# Patient Record
Sex: MALE | Race: BLACK OR AFRICAN AMERICAN | NOT HISPANIC OR LATINO | URBAN - METROPOLITAN AREA
[De-identification: names, ages, dates, MRNs, and addresses within clinical notes are randomized per-mention and may not be internally consistent; named-entity substitution may affect disease eponyms.]

---

## 2017-01-12 ENCOUNTER — EMERGENCY (EMERGENCY)
Facility: HOSPITAL | Age: 37
LOS: 0 days | Discharge: HOME | End: 2017-01-12
Admitting: EMERGENCY MEDICINE
Payer: COMMERCIAL
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
## 2017-01-12 DIAGNOSIS — M79.671 PAIN IN RIGHT FOOT: ICD-10-CM

## 2017-01-12 DIAGNOSIS — S90.31XA CONTUSION OF RIGHT FOOT, INITIAL ENCOUNTER: ICD-10-CM

## 2017-01-12 PROCEDURE — 99283 EMERGENCY DEPT VISIT LOW MDM: CPT

## 2017-01-12 PROCEDURE — 73630 X-RAY EXAM OF FOOT: CPT | Mod: 26,RT

## 2017-03-12 ENCOUNTER — EMERGENCY (EMERGENCY)
Facility: HOSPITAL | Age: 37
LOS: 0 days | Discharge: HOME | End: 2017-03-12
Attending: EMERGENCY MEDICINE | Admitting: EMERGENCY MEDICINE
Payer: COMMERCIAL

## 2017-03-12 VITALS
WEIGHT: 167.99 LBS | HEIGHT: 71 IN | SYSTOLIC BLOOD PRESSURE: 138 MMHG | OXYGEN SATURATION: 100 % | RESPIRATION RATE: 19 BRPM | TEMPERATURE: 99 F | DIASTOLIC BLOOD PRESSURE: 68 MMHG | HEART RATE: 91 BPM

## 2017-03-12 VITALS
RESPIRATION RATE: 16 BRPM | SYSTOLIC BLOOD PRESSURE: 130 MMHG | DIASTOLIC BLOOD PRESSURE: 70 MMHG | TEMPERATURE: 99 F | HEART RATE: 89 BPM | OXYGEN SATURATION: 100 %

## 2017-03-12 DIAGNOSIS — Z11.59 ENCOUNTER FOR SCREENING FOR OTHER VIRAL DISEASES: ICD-10-CM

## 2017-03-12 DIAGNOSIS — R10.2 PELVIC AND PERINEAL PAIN: ICD-10-CM

## 2017-03-12 LAB
APPEARANCE UR: (no result)
BACTERIA # UR AUTO: (no result)
BILIRUB UR-MCNC: (no result)
COLOR SPEC: (no result)
COMMENT - URINE: SIGNIFICANT CHANGE UP
DIFF PNL FLD: (no result)
EPI CELLS # UR: SIGNIFICANT CHANGE UP
GLUCOSE UR QL: NEGATIVE MG/DL — SIGNIFICANT CHANGE UP
KETONES UR-MCNC: NEGATIVE — SIGNIFICANT CHANGE UP
LEUKOCYTE ESTERASE UR-ACNC: (no result)
NITRITE UR-MCNC: NEGATIVE — SIGNIFICANT CHANGE UP
PH UR: 5 — SIGNIFICANT CHANGE UP (ref 4.8–8)
PROT UR-MCNC: 30 MG/DL
RBC CASTS # UR COMP ASSIST: (no result) /HPF (ref 0–4)
SP GR SPEC: 1.02 — SIGNIFICANT CHANGE UP (ref 1.01–1.02)
UROBILINOGEN FLD QL: 4 MG/DL
WBC UR QL: SIGNIFICANT CHANGE UP

## 2017-03-12 PROCEDURE — 99283 EMERGENCY DEPT VISIT LOW MDM: CPT

## 2017-03-12 RX ORDER — IBUPROFEN 200 MG
600 TABLET ORAL ONCE
Qty: 0 | Refills: 0 | Status: COMPLETED | OUTPATIENT
Start: 2017-03-12 | End: 2017-03-12

## 2017-03-12 RX ADMIN — Medication 600 MILLIGRAM(S): at 12:13

## 2017-03-12 RX ADMIN — Medication 600 MILLIGRAM(S): at 11:29

## 2017-03-12 NOTE — ED STATDOCS - GENITOURINARY, MLM
normal... mild swelling above base of penis. mild induration. no fluctuance or erythema,. no hernia. no penile lesions, discharge. no testicular tenderness. no scrotal swelling.

## 2017-03-12 NOTE — ED STATDOCS - ATTENDING CONTRIBUTION TO CARE
I, Darwin Rocha, performed the initial face to face bedside interview with this patient regarding history of present illness, review of symptoms and relevant past medical, social and family history.  I completed an independent physical examination.  I was the initial provider who evaluated this patient. I have signed out the follow up of any pending tests (i.e. labs, radiological studies) to the ACP.  I have communicated the patient’s plan of care and disposition with the ACP.  The history, relevant review of systems, past medical and surgical history, medical decision making, and physical examination was documented by the scribe in my presence and I attest to the accuracy of the documentation.

## 2017-03-12 NOTE — ED STATDOCS - PROGRESS NOTE DETAILS
PA NOTE: Pt seen by intake physician and orders/plan evaluated. PT presenting to ED with complaints of...37 y/o M with no Pmhx presents to ED c/o testicular and penile swelling and pain that began yesterday. States symptoms began after sexual intercourse. Sx have improved since last night. States Denies fever, difficulty urinating, hematuria.  PE: GEN: Awake, alert, interactive, NAD, non-toxic appearing. EYES: PERRL    :  exam by attending.    PLAN:  SIMONA So PA-C Pt. advised FU with urology.  Copies of labs provided to patient.  Kiara So PA-C

## 2017-03-12 NOTE — ED STATDOCS - OBJECTIVE STATEMENT
37 y/o M with no Pmhx presents to ED c/o testicular and penile swelling and pain that began yesterday. States symptoms began after sexual intercourse. Sx have improved since last night. States Denies fever, difficulty urinating, hematuria.

## 2017-03-14 LAB
C TRACH RRNA SPEC QL NAA+PROBE: SIGNIFICANT CHANGE UP
N GONORRHOEA RRNA SPEC QL NAA+PROBE: SIGNIFICANT CHANGE UP
SPECIMEN SOURCE: SIGNIFICANT CHANGE UP

## 2017-03-15 ENCOUNTER — APPOINTMENT (OUTPATIENT)
Dept: UROLOGY | Facility: CLINIC | Age: 37
End: 2017-03-15

## 2017-03-15 VITALS
HEIGHT: 71.5 IN | TEMPERATURE: 97.7 F | HEART RATE: 83 BPM | BODY MASS INDEX: 23 KG/M2 | SYSTOLIC BLOOD PRESSURE: 149 MMHG | DIASTOLIC BLOOD PRESSURE: 79 MMHG | RESPIRATION RATE: 16 BRPM | WEIGHT: 168 LBS

## 2017-03-15 DIAGNOSIS — S30.1XXA CONTUSION OF ABDOMINAL WALL, INITIAL ENCOUNTER: ICD-10-CM

## 2017-03-15 DIAGNOSIS — R31.29 OTHER MICROSCOPIC HEMATURIA: ICD-10-CM

## 2017-03-16 LAB
APPEARANCE: CLEAR
BACTERIA: NEGATIVE
BILIRUBIN URINE: NEGATIVE
BLOOD URINE: NEGATIVE
COLOR: YELLOW
GLUCOSE QUALITATIVE U: NORMAL MG/DL
HYALINE CASTS: 2 /LPF
KETONES URINE: ABNORMAL
LEUKOCYTE ESTERASE URINE: NEGATIVE
MICROSCOPIC-UA: NORMAL
NITRITE URINE: NEGATIVE
PH URINE: 5.5
PROTEIN URINE: NEGATIVE MG/DL
RED BLOOD CELLS URINE: 3 /HPF
SPECIFIC GRAVITY URINE: 1.04
SQUAMOUS EPITHELIAL CELLS: 1 /HPF
UROBILINOGEN URINE: NORMAL MG/DL
WHITE BLOOD CELLS URINE: 2 /HPF

## 2017-03-17 ENCOUNTER — OUTPATIENT (OUTPATIENT)
Dept: OUTPATIENT SERVICES | Facility: HOSPITAL | Age: 37
LOS: 1 days | End: 2017-03-17
Payer: COMMERCIAL

## 2017-03-17 ENCOUNTER — APPOINTMENT (OUTPATIENT)
Dept: CT IMAGING | Facility: CLINIC | Age: 37
End: 2017-03-17

## 2017-03-17 DIAGNOSIS — Z00.8 ENCOUNTER FOR OTHER GENERAL EXAMINATION: ICD-10-CM

## 2017-03-17 LAB — BACTERIA UR CULT: NORMAL

## 2017-03-17 PROCEDURE — 72193 CT PELVIS W/DYE: CPT

## 2017-03-18 ENCOUNTER — EMERGENCY (EMERGENCY)
Facility: HOSPITAL | Age: 37
LOS: 0 days | Discharge: HOME | End: 2017-03-19
Attending: EMERGENCY MEDICINE | Admitting: EMERGENCY MEDICINE
Payer: COMMERCIAL

## 2017-03-18 VITALS
HEART RATE: 84 BPM | DIASTOLIC BLOOD PRESSURE: 81 MMHG | RESPIRATION RATE: 19 BRPM | TEMPERATURE: 98 F | HEIGHT: 71.5 IN | SYSTOLIC BLOOD PRESSURE: 122 MMHG | OXYGEN SATURATION: 100 % | WEIGHT: 167.99 LBS

## 2017-03-18 DIAGNOSIS — L02.211 CUTANEOUS ABSCESS OF ABDOMINAL WALL: ICD-10-CM

## 2017-03-18 PROCEDURE — 99284 EMERGENCY DEPT VISIT MOD MDM: CPT | Mod: 25

## 2017-03-18 PROCEDURE — 10061 I&D ABSCESS COMP/MULTIPLE: CPT

## 2017-03-18 NOTE — ED ADULT NURSE NOTE - CHPI ED SYMPTOMS NEG
no dysuria/no vomiting/no blood in stool/no burning urination/no nausea/no abdominal distension/no hematuria/no chills/no fever/no diarrhea

## 2017-03-19 ENCOUNTER — EMERGENCY (EMERGENCY)
Facility: HOSPITAL | Age: 37
LOS: 0 days | Discharge: HOME | End: 2017-03-20
Attending: EMERGENCY MEDICINE | Admitting: EMERGENCY MEDICINE
Payer: COMMERCIAL

## 2017-03-19 VITALS
DIASTOLIC BLOOD PRESSURE: 71 MMHG | OXYGEN SATURATION: 100 % | HEART RATE: 81 BPM | SYSTOLIC BLOOD PRESSURE: 116 MMHG | RESPIRATION RATE: 18 BRPM

## 2017-03-19 VITALS
RESPIRATION RATE: 16 BRPM | WEIGHT: 179.9 LBS | DIASTOLIC BLOOD PRESSURE: 72 MMHG | HEART RATE: 78 BPM | TEMPERATURE: 98 F | SYSTOLIC BLOOD PRESSURE: 149 MMHG | OXYGEN SATURATION: 99 %

## 2017-03-19 DIAGNOSIS — Z48.00 ENCOUNTER FOR CHANGE OR REMOVAL OF NONSURGICAL WOUND DRESSING: ICD-10-CM

## 2017-03-19 LAB
ALBUMIN SERPL ELPH-MCNC: 3.4 G/DL — SIGNIFICANT CHANGE UP (ref 3.3–5)
ALP SERPL-CCNC: 69 U/L — SIGNIFICANT CHANGE UP (ref 40–120)
ALT FLD-CCNC: 22 U/L — SIGNIFICANT CHANGE UP (ref 12–78)
ANION GAP SERPL CALC-SCNC: 10 MMOL/L — SIGNIFICANT CHANGE UP (ref 5–17)
AST SERPL-CCNC: 17 U/L — SIGNIFICANT CHANGE UP (ref 15–37)
BASOPHILS # BLD AUTO: 0.1 K/UL — SIGNIFICANT CHANGE UP (ref 0–0.2)
BASOPHILS NFR BLD AUTO: 1.1 % — SIGNIFICANT CHANGE UP (ref 0–2)
BILIRUB SERPL-MCNC: 0.2 MG/DL — SIGNIFICANT CHANGE UP (ref 0.2–1.2)
BUN SERPL-MCNC: 21 MG/DL — SIGNIFICANT CHANGE UP (ref 7–23)
CALCIUM SERPL-MCNC: 8.4 MG/DL — LOW (ref 8.5–10.1)
CHLORIDE SERPL-SCNC: 102 MMOL/L — SIGNIFICANT CHANGE UP (ref 96–108)
CO2 SERPL-SCNC: 29 MMOL/L — SIGNIFICANT CHANGE UP (ref 22–31)
CREAT SERPL-MCNC: 1.29 MG/DL — SIGNIFICANT CHANGE UP (ref 0.5–1.3)
EOSINOPHIL # BLD AUTO: 0.3 K/UL — SIGNIFICANT CHANGE UP (ref 0–0.5)
EOSINOPHIL NFR BLD AUTO: 3.4 % — SIGNIFICANT CHANGE UP (ref 0–6)
GLUCOSE SERPL-MCNC: 93 MG/DL — SIGNIFICANT CHANGE UP (ref 70–99)
HCT VFR BLD CALC: 44.6 % — SIGNIFICANT CHANGE UP (ref 39–50)
HGB BLD-MCNC: 13.7 G/DL — SIGNIFICANT CHANGE UP (ref 13–17)
LACTATE SERPL-SCNC: 1.1 MMOL/L — SIGNIFICANT CHANGE UP (ref 0.7–2)
LYMPHOCYTES # BLD AUTO: 2.6 K/UL — SIGNIFICANT CHANGE UP (ref 1–3.3)
LYMPHOCYTES # BLD AUTO: 27.6 % — SIGNIFICANT CHANGE UP (ref 13–44)
MCHC RBC-ENTMCNC: 27.6 PG — SIGNIFICANT CHANGE UP (ref 27–34)
MCHC RBC-ENTMCNC: 30.6 GM/DL — LOW (ref 32–36)
MCV RBC AUTO: 90.2 FL — SIGNIFICANT CHANGE UP (ref 80–100)
MONOCYTES # BLD AUTO: 1 K/UL — HIGH (ref 0–0.9)
MONOCYTES NFR BLD AUTO: 10.2 % — SIGNIFICANT CHANGE UP (ref 2–14)
NEUTROPHILS # BLD AUTO: 5.4 K/UL — SIGNIFICANT CHANGE UP (ref 1.8–7.4)
NEUTROPHILS NFR BLD AUTO: 57.6 % — SIGNIFICANT CHANGE UP (ref 43–77)
PLATELET # BLD AUTO: 354 K/UL — SIGNIFICANT CHANGE UP (ref 150–400)
POTASSIUM SERPL-MCNC: 3.7 MMOL/L — SIGNIFICANT CHANGE UP (ref 3.5–5.3)
POTASSIUM SERPL-SCNC: 3.7 MMOL/L — SIGNIFICANT CHANGE UP (ref 3.5–5.3)
PROT SERPL-MCNC: 7.9 GM/DL — SIGNIFICANT CHANGE UP (ref 6–8.3)
RBC # BLD: 4.95 M/UL — SIGNIFICANT CHANGE UP (ref 4.2–5.8)
RBC # FLD: 11.7 % — SIGNIFICANT CHANGE UP (ref 10.3–14.5)
SODIUM SERPL-SCNC: 141 MMOL/L — SIGNIFICANT CHANGE UP (ref 135–145)
WBC # BLD: 9.4 K/UL — SIGNIFICANT CHANGE UP (ref 3.8–10.5)
WBC # FLD AUTO: 9.4 K/UL — SIGNIFICANT CHANGE UP (ref 3.8–10.5)

## 2017-03-19 PROCEDURE — 99281 EMR DPT VST MAYX REQ PHY/QHP: CPT

## 2017-03-19 RX ORDER — OXYCODONE HYDROCHLORIDE 5 MG/1
1 TABLET ORAL
Qty: 12 | Refills: 0 | OUTPATIENT
Start: 2017-03-19

## 2017-03-19 RX ORDER — MORPHINE SULFATE 50 MG/1
8 CAPSULE, EXTENDED RELEASE ORAL ONCE
Qty: 0 | Refills: 0 | Status: DISCONTINUED | OUTPATIENT
Start: 2017-03-19 | End: 2017-03-19

## 2017-03-19 RX ORDER — MORPHINE SULFATE 50 MG/1
4 CAPSULE, EXTENDED RELEASE ORAL ONCE
Qty: 0 | Refills: 0 | Status: DISCONTINUED | OUTPATIENT
Start: 2017-03-19 | End: 2017-03-19

## 2017-03-19 RX ORDER — AZTREONAM 2 G
2 VIAL (EA) INJECTION
Qty: 28 | Refills: 0 | OUTPATIENT
Start: 2017-03-19 | End: 2017-03-26

## 2017-03-19 RX ORDER — VANCOMYCIN HCL 1 G
1000 VIAL (EA) INTRAVENOUS ONCE
Qty: 0 | Refills: 0 | Status: COMPLETED | OUTPATIENT
Start: 2017-03-19 | End: 2017-03-19

## 2017-03-19 RX ADMIN — Medication 250 MILLIGRAM(S): at 02:40

## 2017-03-19 RX ADMIN — MORPHINE SULFATE 8 MILLIGRAM(S): 50 CAPSULE, EXTENDED RELEASE ORAL at 03:19

## 2017-03-19 RX ADMIN — MORPHINE SULFATE 8 MILLIGRAM(S): 50 CAPSULE, EXTENDED RELEASE ORAL at 03:50

## 2017-03-19 RX ADMIN — MORPHINE SULFATE 4 MILLIGRAM(S): 50 CAPSULE, EXTENDED RELEASE ORAL at 02:00

## 2017-03-19 RX ADMIN — MORPHINE SULFATE 4 MILLIGRAM(S): 50 CAPSULE, EXTENDED RELEASE ORAL at 02:30

## 2017-03-19 NOTE — ED PROVIDER NOTE - MEDICAL DECISION MAKING DETAILS
35 yo male with hematoma to lower abdominal/pelvic wall, now with abscess formed.  No Edwin's.  Afebrile, labs wnl.  s/p I/D.

## 2017-03-19 NOTE — ED PROVIDER NOTE - PROGRESS NOTE DETAILS
Found the report of CT scan that was done on Friday. Results showed fluid collection ventral abd wall with no intra abd communication. 4.2 x 2.5 x 3.3 cm
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
## 2017-03-19 NOTE — ED PROVIDER NOTE - SKIN, MLM
Skin normal color for race, warm, dry and intact. No evidence of rash. Skin normal color for race, warm, dry and intact. Large 5 x 3 cm abscess just proximal to shaft of penis on abdominal wall, fluctuant, actively draining.  No surrounding erythema

## 2017-03-19 NOTE — ED PROVIDER NOTE - DETAILS:
Saumya Jarquin MD - The scribe's documentation has been prepared under my direction and personally reviewed by me in its entirety. I confirm that the note above accurately reflects all work, treatment, procedures, and medical decision making performed by me.

## 2017-03-19 NOTE — ED PROVIDER NOTE - NS ED MD SCRIBE ATTENDING SCRIBE SECTIONS
REVIEW OF SYSTEMS/PHYSICAL EXAM/HISTORY OF PRESENT ILLNESS/PAST MEDICAL/SURGICAL/SOCIAL HISTORY/PROGRESS NOTE

## 2017-03-20 NOTE — ED PROVIDER NOTE - SKIN WOUND TYPE
abscess anterior abdominal wall just above shaft of penis, markedly improved from yesterday, actively draining, no erythema/abscess(s)

## 2017-03-20 NOTE — ED PROVIDER NOTE - PROGRESS NOTE DETAILS
Moderate amount of purulent fluid expressed from already open and draining abscess.  Offered patient further exploration but pt refuses at this time.  Will continue warm compresses and showers and return to ED if symptoms worsen, f/u urology as scheduled Friday, but try to move the appointment sooner.  IF the appointment can't be moved up pt instructed to return to ED for wound check with me on Wednesday.
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
## 2017-03-24 ENCOUNTER — APPOINTMENT (OUTPATIENT)
Dept: UROLOGY | Facility: CLINIC | Age: 37
End: 2017-03-24

## 2017-03-24 LAB
CULTURE RESULTS: SIGNIFICANT CHANGE UP
CULTURE RESULTS: SIGNIFICANT CHANGE UP
SPECIMEN SOURCE: SIGNIFICANT CHANGE UP
SPECIMEN SOURCE: SIGNIFICANT CHANGE UP

## 2017-09-05 ENCOUNTER — EMERGENCY (EMERGENCY)
Facility: HOSPITAL | Age: 37
LOS: 0 days | Discharge: HOME | End: 2017-09-05
Attending: EMERGENCY MEDICINE | Admitting: EMERGENCY MEDICINE
Payer: COMMERCIAL

## 2017-09-05 VITALS
WEIGHT: 160.06 LBS | TEMPERATURE: 98 F | HEIGHT: 71 IN | DIASTOLIC BLOOD PRESSURE: 85 MMHG | HEART RATE: 60 BPM | SYSTOLIC BLOOD PRESSURE: 112 MMHG | RESPIRATION RATE: 16 BRPM

## 2017-09-05 VITALS
RESPIRATION RATE: 18 BRPM | DIASTOLIC BLOOD PRESSURE: 58 MMHG | OXYGEN SATURATION: 100 % | TEMPERATURE: 98 F | SYSTOLIC BLOOD PRESSURE: 124 MMHG | HEART RATE: 75 BPM

## 2017-09-05 DIAGNOSIS — R07.9 CHEST PAIN, UNSPECIFIED: ICD-10-CM

## 2017-09-05 DIAGNOSIS — J40 BRONCHITIS, NOT SPECIFIED AS ACUTE OR CHRONIC: ICD-10-CM

## 2017-09-05 LAB
ANION GAP SERPL CALC-SCNC: 5 MMOL/L — SIGNIFICANT CHANGE UP (ref 5–17)
BUN SERPL-MCNC: 11 MG/DL — SIGNIFICANT CHANGE UP (ref 7–23)
CALCIUM SERPL-MCNC: 9.3 MG/DL — SIGNIFICANT CHANGE UP (ref 8.5–10.1)
CHLORIDE SERPL-SCNC: 102 MMOL/L — SIGNIFICANT CHANGE UP (ref 96–108)
CO2 SERPL-SCNC: 31 MMOL/L — SIGNIFICANT CHANGE UP (ref 22–31)
CREAT SERPL-MCNC: 1.1 MG/DL — SIGNIFICANT CHANGE UP (ref 0.5–1.3)
GLUCOSE SERPL-MCNC: 95 MG/DL — SIGNIFICANT CHANGE UP (ref 70–99)
HCT VFR BLD CALC: 42 % — SIGNIFICANT CHANGE UP (ref 39–50)
HGB BLD-MCNC: 14 G/DL — SIGNIFICANT CHANGE UP (ref 13–17)
MCHC RBC-ENTMCNC: 29.6 PG — SIGNIFICANT CHANGE UP (ref 27–34)
MCHC RBC-ENTMCNC: 33.3 GM/DL — SIGNIFICANT CHANGE UP (ref 32–36)
MCV RBC AUTO: 89 FL — SIGNIFICANT CHANGE UP (ref 80–100)
PLATELET # BLD AUTO: 249 K/UL — SIGNIFICANT CHANGE UP (ref 150–400)
POTASSIUM SERPL-MCNC: 4 MMOL/L — SIGNIFICANT CHANGE UP (ref 3.5–5.3)
POTASSIUM SERPL-SCNC: 4 MMOL/L — SIGNIFICANT CHANGE UP (ref 3.5–5.3)
RBC # BLD: 4.72 M/UL — SIGNIFICANT CHANGE UP (ref 4.2–5.8)
RBC # FLD: 11.8 % — SIGNIFICANT CHANGE UP (ref 10.3–14.5)
SODIUM SERPL-SCNC: 138 MMOL/L — SIGNIFICANT CHANGE UP (ref 135–145)
TROPONIN I SERPL-MCNC: <0.015 NG/ML — SIGNIFICANT CHANGE UP (ref 0.01–0.04)
WBC # BLD: 4.5 K/UL — SIGNIFICANT CHANGE UP (ref 3.8–10.5)
WBC # FLD AUTO: 4.5 K/UL — SIGNIFICANT CHANGE UP (ref 3.8–10.5)

## 2017-09-05 PROCEDURE — 93010 ELECTROCARDIOGRAM REPORT: CPT

## 2017-09-05 PROCEDURE — 99285 EMERGENCY DEPT VISIT HI MDM: CPT

## 2017-09-05 PROCEDURE — 71010: CPT | Mod: 26

## 2017-09-05 RX ORDER — ASPIRIN/CALCIUM CARB/MAGNESIUM 324 MG
325 TABLET ORAL ONCE
Qty: 0 | Refills: 0 | Status: COMPLETED | OUTPATIENT
Start: 2017-09-05 | End: 2017-09-05

## 2017-09-05 RX ADMIN — Medication 325 MILLIGRAM(S): at 10:15

## 2017-09-05 NOTE — ED ADULT NURSE NOTE - OBJECTIVE STATEMENT
Pt states he has been SOB past few weeks. States he feels that the SOB is due to anxiety. Denies chest pain or discomfort. Pt states he also has been having 'indigestion' that TUMS helps for a short period of time but then it returns. Color good.

## 2017-09-05 NOTE — ED ADULT NURSE NOTE - CHPI ED SYMPTOMS NEG
no body aches/no chest pain/no hemoptysis/no cough/no headache/no chills/no edema/no wheezing/no fever/no diaphoresis

## 2017-09-05 NOTE — ED PROVIDER NOTE - OBJECTIVE STATEMENT
36 y/o male with PMHx of asthma presents to the ED c/o intermittent right sided chest pain for 2 days, non exertional.  No hx of DVT or PE.  No +perc criteria.  denies fever. denies HA or neck pain. no sob. no abd pain. no n/v/d. no urinary f/u/d. no back pain. no motor or sensory deficits. denies illicit drug use. no recent travel. no rash. no other acute issues symptoms or concerns

## 2017-09-08 ENCOUNTER — APPOINTMENT (OUTPATIENT)
Dept: CARDIOLOGY | Facility: CLINIC | Age: 37
End: 2017-09-08
Payer: COMMERCIAL

## 2017-09-08 ENCOUNTER — NON-APPOINTMENT (OUTPATIENT)
Age: 37
End: 2017-09-08

## 2017-09-08 VITALS — HEIGHT: 71.5 IN | WEIGHT: 168 LBS | BODY MASS INDEX: 23 KG/M2

## 2017-09-08 VITALS — SYSTOLIC BLOOD PRESSURE: 122 MMHG | HEART RATE: 66 BPM | OXYGEN SATURATION: 100 % | DIASTOLIC BLOOD PRESSURE: 72 MMHG

## 2017-09-08 PROCEDURE — 99214 OFFICE O/P EST MOD 30 MIN: CPT | Mod: 25

## 2017-09-08 PROCEDURE — 93000 ELECTROCARDIOGRAM COMPLETE: CPT

## 2017-09-13 ENCOUNTER — APPOINTMENT (OUTPATIENT)
Dept: CARDIOLOGY | Facility: CLINIC | Age: 37
End: 2017-09-13
Payer: COMMERCIAL

## 2017-09-13 PROCEDURE — 93224 XTRNL ECG REC UP TO 48 HRS: CPT

## 2017-09-13 PROCEDURE — 93306 TTE W/DOPPLER COMPLETE: CPT

## 2017-09-15 ENCOUNTER — NON-APPOINTMENT (OUTPATIENT)
Age: 37
End: 2017-09-15

## 2017-09-18 ENCOUNTER — APPOINTMENT (OUTPATIENT)
Dept: CARDIOLOGY | Facility: CLINIC | Age: 37
End: 2017-09-18
Payer: COMMERCIAL

## 2017-09-27 ENCOUNTER — APPOINTMENT (OUTPATIENT)
Dept: CARDIOLOGY | Facility: CLINIC | Age: 37
End: 2017-09-27
Payer: COMMERCIAL

## 2017-09-27 PROCEDURE — 93351 STRESS TTE COMPLETE: CPT

## 2017-09-27 PROCEDURE — 93320 DOPPLER ECHO COMPLETE: CPT

## 2017-09-27 PROCEDURE — 93325 DOPPLER ECHO COLOR FLOW MAPG: CPT

## 2017-09-28 ENCOUNTER — APPOINTMENT (OUTPATIENT)
Dept: CARDIOLOGY | Facility: CLINIC | Age: 37
End: 2017-09-28

## 2018-02-12 ENCOUNTER — EMERGENCY (EMERGENCY)
Facility: HOSPITAL | Age: 38
LOS: 0 days | Discharge: HOME | End: 2018-02-12
Attending: EMERGENCY MEDICINE | Admitting: EMERGENCY MEDICINE
Payer: COMMERCIAL

## 2018-02-12 VITALS — WEIGHT: 164.91 LBS | OXYGEN SATURATION: 97 % | HEART RATE: 76 BPM | HEIGHT: 71 IN

## 2018-02-12 VITALS
OXYGEN SATURATION: 100 % | TEMPERATURE: 98 F | HEART RATE: 60 BPM | RESPIRATION RATE: 16 BRPM | DIASTOLIC BLOOD PRESSURE: 89 MMHG | SYSTOLIC BLOOD PRESSURE: 137 MMHG

## 2018-02-12 DIAGNOSIS — J45.901 UNSPECIFIED ASTHMA WITH (ACUTE) EXACERBATION: ICD-10-CM

## 2018-02-12 DIAGNOSIS — R06.2 WHEEZING: ICD-10-CM

## 2018-02-12 PROCEDURE — 99285 EMERGENCY DEPT VISIT HI MDM: CPT | Mod: 25

## 2018-02-12 RX ORDER — AZITHROMYCIN 500 MG/1
1 TABLET, FILM COATED ORAL
Qty: 4 | Refills: 0 | OUTPATIENT
Start: 2018-02-12 | End: 2018-02-15

## 2018-02-12 RX ORDER — ALBUTEROL 90 UG/1
2 AEROSOL, METERED ORAL
Qty: 1 | Refills: 0 | OUTPATIENT
Start: 2018-02-12

## 2018-02-12 RX ORDER — ALBUTEROL 90 UG/1
1 AEROSOL, METERED ORAL ONCE
Qty: 0 | Refills: 0 | Status: DISCONTINUED | OUTPATIENT
Start: 2018-02-12 | End: 2018-02-12

## 2018-02-12 RX ORDER — AZITHROMYCIN 500 MG/1
500 TABLET, FILM COATED ORAL ONCE
Qty: 0 | Refills: 0 | Status: COMPLETED | OUTPATIENT
Start: 2018-02-12 | End: 2018-02-12

## 2018-02-12 RX ORDER — IPRATROPIUM/ALBUTEROL SULFATE 18-103MCG
3 AEROSOL WITH ADAPTER (GRAM) INHALATION ONCE
Qty: 0 | Refills: 0 | Status: COMPLETED | OUTPATIENT
Start: 2018-02-12 | End: 2018-02-12

## 2018-02-12 RX ADMIN — Medication 3 MILLILITER(S): at 20:50

## 2018-02-12 RX ADMIN — Medication 60 MILLIGRAM(S): at 21:19

## 2018-02-12 RX ADMIN — AZITHROMYCIN 500 MILLIGRAM(S): 500 TABLET, FILM COATED ORAL at 21:19

## 2018-02-12 NOTE — ED STATDOCS - ENMT, MLM
+sinus tenderness, maxillary and frontal. Nasal mucosa clear.  Mouth with normal mucosa  Throat has no vesicles, no oropharyngeal exudates and uvula is midline.

## 2018-02-12 NOTE — ED STATDOCS - OBJECTIVE STATEMENT
36 y/o male with PMHx of asthma presents to the ED c/o postnasal drip, sinus pressure starting 1.5 weeks ago and wheezing starting over the last week. PMD Dr. Wright.

## 2018-02-12 NOTE — ED ADULT TRIAGE NOTE - CHIEF COMPLAINT QUOTE
c/o wheezing in sleep, cough, post nasal drip today per pt. Respirations even and unlabored in triage, no distress noted. pt denies fevers. Denies hx of asthma

## 2018-02-12 NOTE — ED STATDOCS - ATTENDING CONTRIBUTION TO CARE
Attending Contribution to Care: I, Bettie Evans, performed the initial face to face bedside interview with this patient regarding history of present illness, review of symptoms and relevant past medical, social and family history.  I completed an independent physical examination.  I was the initial provider who evaluated this patient and the history, physical, and MDM reflect this intial assessment. I have signed out the follow up of any pending tests after the original (i.e. labs, radiological studies) to the ACP with instructions to review any with instructions to review any concerning findings to me prior to discharge.  I have communicated the patient’s plan of care and disposition with the ACP.

## 2018-02-12 NOTE — ED ADULT NURSE REASSESSMENT NOTE - NS ED NURSE REASSESS COMMENT FT1
Pharmacy called to obtain albuterol/proventil inhaler prior to pt being discharged. MEREDITH Arora aware.

## 2018-02-13 ENCOUNTER — EMERGENCY (EMERGENCY)
Facility: HOSPITAL | Age: 38
LOS: 0 days | Discharge: HOME | End: 2018-02-13
Attending: EMERGENCY MEDICINE | Admitting: EMERGENCY MEDICINE
Payer: COMMERCIAL

## 2018-02-13 VITALS
SYSTOLIC BLOOD PRESSURE: 141 MMHG | OXYGEN SATURATION: 100 % | RESPIRATION RATE: 18 BRPM | HEART RATE: 87 BPM | DIASTOLIC BLOOD PRESSURE: 83 MMHG | HEIGHT: 71 IN | WEIGHT: 164.91 LBS | TEMPERATURE: 100 F

## 2018-02-13 DIAGNOSIS — R00.0 TACHYCARDIA, UNSPECIFIED: ICD-10-CM

## 2018-02-13 DIAGNOSIS — T38.0X5A ADVERSE EFFECT OF GLUCOCORTICOIDS AND SYNTHETIC ANALOGUES, INITIAL ENCOUNTER: ICD-10-CM

## 2018-02-13 DIAGNOSIS — R42 DIZZINESS AND GIDDINESS: ICD-10-CM

## 2018-02-13 DIAGNOSIS — J45.909 UNSPECIFIED ASTHMA, UNCOMPLICATED: ICD-10-CM

## 2018-02-13 PROCEDURE — 99285 EMERGENCY DEPT VISIT HI MDM: CPT

## 2018-02-13 NOTE — ED STATDOCS - OBJECTIVE STATEMENT
38 yo male presents c/o insomnia, anxiety, tachycardia after receiving prednisone, albuterol, and zithromax here last night for asthma.

## 2018-02-13 NOTE — ED STATDOCS - MEDICAL DECISION MAKING DETAILS
Pt with anxiety, tachycardia, insomnia after receiving prednisone yesterday for asthma.  Pt ok for DC home.

## 2018-06-12 ENCOUNTER — EMERGENCY (EMERGENCY)
Facility: HOSPITAL | Age: 38
LOS: 0 days | Discharge: HOME | End: 2018-06-13
Attending: EMERGENCY MEDICINE | Admitting: EMERGENCY MEDICINE
Payer: COMMERCIAL

## 2018-06-12 VITALS
SYSTOLIC BLOOD PRESSURE: 132 MMHG | TEMPERATURE: 98 F | OXYGEN SATURATION: 100 % | RESPIRATION RATE: 18 BRPM | DIASTOLIC BLOOD PRESSURE: 76 MMHG | HEART RATE: 58 BPM

## 2018-06-12 VITALS — WEIGHT: 164.91 LBS

## 2018-06-12 LAB
ALBUMIN SERPL ELPH-MCNC: 3.8 G/DL — SIGNIFICANT CHANGE UP (ref 3.3–5)
ALP SERPL-CCNC: 61 U/L — SIGNIFICANT CHANGE UP (ref 40–120)
ALT FLD-CCNC: 27 U/L — SIGNIFICANT CHANGE UP (ref 12–78)
ANION GAP SERPL CALC-SCNC: 6 MMOL/L — SIGNIFICANT CHANGE UP (ref 5–17)
APPEARANCE UR: CLEAR — SIGNIFICANT CHANGE UP
AST SERPL-CCNC: 24 U/L — SIGNIFICANT CHANGE UP (ref 15–37)
BASOPHILS # BLD AUTO: 0.01 K/UL — SIGNIFICANT CHANGE UP (ref 0–0.2)
BASOPHILS NFR BLD AUTO: 0.2 % — SIGNIFICANT CHANGE UP (ref 0–2)
BILIRUB SERPL-MCNC: 0.3 MG/DL — SIGNIFICANT CHANGE UP (ref 0.2–1.2)
BILIRUB UR-MCNC: NEGATIVE — SIGNIFICANT CHANGE UP
BUN SERPL-MCNC: 15 MG/DL — SIGNIFICANT CHANGE UP (ref 7–23)
CALCIUM SERPL-MCNC: 9.1 MG/DL — SIGNIFICANT CHANGE UP (ref 8.5–10.1)
CHLORIDE SERPL-SCNC: 105 MMOL/L — SIGNIFICANT CHANGE UP (ref 96–108)
CO2 SERPL-SCNC: 28 MMOL/L — SIGNIFICANT CHANGE UP (ref 22–31)
COLOR SPEC: YELLOW — SIGNIFICANT CHANGE UP
CREAT SERPL-MCNC: 1.04 MG/DL — SIGNIFICANT CHANGE UP (ref 0.5–1.3)
DIFF PNL FLD: ABNORMAL
EOSINOPHIL # BLD AUTO: 0.18 K/UL — SIGNIFICANT CHANGE UP (ref 0–0.5)
EOSINOPHIL NFR BLD AUTO: 3.8 % — SIGNIFICANT CHANGE UP (ref 0–6)
GLUCOSE SERPL-MCNC: 97 MG/DL — SIGNIFICANT CHANGE UP (ref 70–99)
GLUCOSE UR QL: NEGATIVE MG/DL — SIGNIFICANT CHANGE UP
HCT VFR BLD CALC: 41.8 % — SIGNIFICANT CHANGE UP (ref 39–50)
HGB BLD-MCNC: 13.5 G/DL — SIGNIFICANT CHANGE UP (ref 13–17)
IMM GRANULOCYTES NFR BLD AUTO: 0 % — SIGNIFICANT CHANGE UP (ref 0–1.5)
KETONES UR-MCNC: NEGATIVE — SIGNIFICANT CHANGE UP
LEUKOCYTE ESTERASE UR-ACNC: NEGATIVE — SIGNIFICANT CHANGE UP
LIDOCAIN IGE QN: 120 U/L — SIGNIFICANT CHANGE UP (ref 73–393)
LYMPHOCYTES # BLD AUTO: 2.13 K/UL — SIGNIFICANT CHANGE UP (ref 1–3.3)
LYMPHOCYTES # BLD AUTO: 44.4 % — HIGH (ref 13–44)
MCHC RBC-ENTMCNC: 28.3 PG — SIGNIFICANT CHANGE UP (ref 27–34)
MCHC RBC-ENTMCNC: 32.3 GM/DL — SIGNIFICANT CHANGE UP (ref 32–36)
MCV RBC AUTO: 87.6 FL — SIGNIFICANT CHANGE UP (ref 80–100)
MONOCYTES # BLD AUTO: 0.5 K/UL — SIGNIFICANT CHANGE UP (ref 0–0.9)
MONOCYTES NFR BLD AUTO: 10.4 % — SIGNIFICANT CHANGE UP (ref 2–14)
NEUTROPHILS # BLD AUTO: 1.98 K/UL — SIGNIFICANT CHANGE UP (ref 1.8–7.4)
NEUTROPHILS NFR BLD AUTO: 41.2 % — LOW (ref 43–77)
NITRITE UR-MCNC: NEGATIVE — SIGNIFICANT CHANGE UP
NRBC # BLD: 0 /100 WBCS — SIGNIFICANT CHANGE UP (ref 0–0)
PH UR: 6 — SIGNIFICANT CHANGE UP (ref 5–8)
PLATELET # BLD AUTO: 277 K/UL — SIGNIFICANT CHANGE UP (ref 150–400)
POTASSIUM SERPL-MCNC: 3.8 MMOL/L — SIGNIFICANT CHANGE UP (ref 3.5–5.3)
POTASSIUM SERPL-SCNC: 3.8 MMOL/L — SIGNIFICANT CHANGE UP (ref 3.5–5.3)
PROT SERPL-MCNC: 8.1 GM/DL — SIGNIFICANT CHANGE UP (ref 6–8.3)
PROT UR-MCNC: NEGATIVE MG/DL — SIGNIFICANT CHANGE UP
RBC # BLD: 4.77 M/UL — SIGNIFICANT CHANGE UP (ref 4.2–5.8)
RBC # FLD: 12.6 % — SIGNIFICANT CHANGE UP (ref 10.3–14.5)
SODIUM SERPL-SCNC: 139 MMOL/L — SIGNIFICANT CHANGE UP (ref 135–145)
SP GR SPEC: 1.02 — SIGNIFICANT CHANGE UP (ref 1.01–1.02)
UROBILINOGEN FLD QL: NEGATIVE MG/DL — SIGNIFICANT CHANGE UP
WBC # BLD: 4.8 K/UL — SIGNIFICANT CHANGE UP (ref 3.8–10.5)
WBC # FLD AUTO: 4.8 K/UL — SIGNIFICANT CHANGE UP (ref 3.8–10.5)

## 2018-06-12 PROCEDURE — 74177 CT ABD & PELVIS W/CONTRAST: CPT | Mod: 26

## 2018-06-12 PROCEDURE — 99285 EMERGENCY DEPT VISIT HI MDM: CPT

## 2018-06-12 RX ORDER — ONDANSETRON 8 MG/1
4 TABLET, FILM COATED ORAL ONCE
Qty: 0 | Refills: 0 | Status: COMPLETED | OUTPATIENT
Start: 2018-06-12 | End: 2018-06-12

## 2018-06-12 RX ORDER — SODIUM CHLORIDE 9 MG/ML
1000 INJECTION INTRAMUSCULAR; INTRAVENOUS; SUBCUTANEOUS ONCE
Qty: 0 | Refills: 0 | Status: COMPLETED | OUTPATIENT
Start: 2018-06-12 | End: 2018-06-12

## 2018-06-12 RX ADMIN — SODIUM CHLORIDE 1000 MILLILITER(S): 9 INJECTION INTRAMUSCULAR; INTRAVENOUS; SUBCUTANEOUS at 18:58

## 2018-06-12 RX ADMIN — ONDANSETRON 4 MILLIGRAM(S): 8 TABLET, FILM COATED ORAL at 18:58

## 2018-06-12 RX ADMIN — ONDANSETRON 4 MILLIGRAM(S): 8 TABLET, FILM COATED ORAL at 23:18

## 2018-06-12 NOTE — ED STATDOCS - ATTENDING CONTRIBUTION TO CARE
I, Biju Beach MD,  performed the initial face to face bedside interview with this patient regarding history of present illness, review of symptoms and relevant past medical, social and family history.  I completed an independent physical examination.  I was the initial provider who evaluated this patient. I have signed out the follow up of any pending tests (i.e. labs, radiological studies) to the ACP.  I have communicated the patient’s plan of care and disposition with the ACP.  The history, relevant review of systems, past medical and surgical history, medical decision making, and physical examination was documented by the scribe in my presence and I attest to the accuracy of the documentation.

## 2018-06-12 NOTE — ED STATDOCS - OBJECTIVE STATEMENT
39 y/o M presents to the ED c/o consistent, constant nausea and intermittent abd pain since taking Augmentin on Wednesday for Sinusitis. Augmentin was  stopped last Wednesday. Since then has been feeling "increased heart rate," nausea and dry heaves. Patient called Urgent Care last night advised to get Probiotic. Pt took Probiotic this morning, Daily [facility], with no relief. Denies vomiting. At this time mildly nausea. Denies dysuria, hematuria. No bowel movement today. Decreased appetite although had breakfast this morning.  Allegra 180, Flonase. Non-smoker. Rare alcohol. No recreational drugs.

## 2018-06-12 NOTE — ED STATDOCS - PROGRESS NOTE DETAILS
39 yo male presents with nausea and abd pain. Pt states he took 5 days of amoxicillin for a sinus infection before he started to feel nauseous. Spoke with his doctor who told him 37 yo male presents with nausea and abd pain. Pt states he took 5 days of amoxicillin for a sinus infection before he started to feel nauseous. Spoke with his doctor who told him to stop the abx and take probiotics. Pt still with continued nausea and lower abd pain. Denies f/c/sweating, cough, hematuria, dysuria, v/d/c. -Henrry Del Rosario PA-C

## 2018-06-13 LAB
CULTURE RESULTS: NO GROWTH — SIGNIFICANT CHANGE UP
SPECIMEN SOURCE: SIGNIFICANT CHANGE UP

## 2018-06-13 RX ORDER — ONDANSETRON 8 MG/1
1 TABLET, FILM COATED ORAL
Qty: 9 | Refills: 0 | OUTPATIENT
Start: 2018-06-13 | End: 2018-06-15

## 2018-06-14 DIAGNOSIS — R11.0 NAUSEA: ICD-10-CM

## 2018-06-14 DIAGNOSIS — R19.7 DIARRHEA, UNSPECIFIED: ICD-10-CM

## 2018-06-14 DIAGNOSIS — Z79.2 LONG TERM (CURRENT) USE OF ANTIBIOTICS: ICD-10-CM

## 2018-09-10 ENCOUNTER — TRANSCRIPTION ENCOUNTER (OUTPATIENT)
Age: 38
End: 2018-09-10

## 2019-02-19 ENCOUNTER — RESULT REVIEW (OUTPATIENT)
Age: 39
End: 2019-02-19

## 2019-10-08 ENCOUNTER — EMERGENCY (EMERGENCY)
Facility: HOSPITAL | Age: 39
LOS: 0 days | Discharge: HOME | End: 2019-10-08
Attending: EMERGENCY MEDICINE
Payer: MEDICAID

## 2019-10-08 VITALS — HEIGHT: 71.5 IN | WEIGHT: 167.99 LBS

## 2019-10-08 VITALS
HEART RATE: 53 BPM | OXYGEN SATURATION: 100 % | RESPIRATION RATE: 18 BRPM | DIASTOLIC BLOOD PRESSURE: 78 MMHG | SYSTOLIC BLOOD PRESSURE: 129 MMHG | TEMPERATURE: 98 F

## 2019-10-08 DIAGNOSIS — R09.81 NASAL CONGESTION: ICD-10-CM

## 2019-10-08 DIAGNOSIS — J45.909 UNSPECIFIED ASTHMA, UNCOMPLICATED: ICD-10-CM

## 2019-10-08 PROCEDURE — 99282 EMERGENCY DEPT VISIT SF MDM: CPT

## 2019-10-08 RX ORDER — PSEUDOEPHEDRINE HCL 30 MG
30 TABLET ORAL ONCE
Refills: 0 | Status: DISCONTINUED | OUTPATIENT
Start: 2019-10-08 | End: 2019-10-08

## 2019-10-08 RX ORDER — PSEUDOEPHEDRINE HCL 30 MG
30 TABLET ORAL ONCE
Refills: 0 | Status: COMPLETED | OUTPATIENT
Start: 2019-10-08 | End: 2019-10-08

## 2019-10-08 RX ADMIN — Medication 30 MILLIGRAM(S): at 18:13

## 2019-10-08 NOTE — ED STATDOCS - CARE PROVIDER_API CALL
Chevy Brambila)  Allergy and Immunology  [address] [address] [facility], [address] [place] [address]  Phone: [phone]  Fax: [phone]  Follow Up Time:

## 2019-10-08 NOTE — ED STATDOCS - PATIENT PORTAL LINK FT
You can access the FollowMyHealth Patient Portal offered by [facility] by registering at the following website: [email or web address]. By joining [employer]’s FollowMyHealth portal, you will also be able to view your health information using other applications (apps) compatible with our system.

## 2019-10-08 NOTE — ED ADULT NURSE NOTE - CHPI ED NUR SYMPTOMS NEG
no weakness/no pain/no vomiting/no tingling/no decreased eating/drinking/no chills/no fever/no nausea/no dizziness

## 2019-10-08 NOTE — ED STATDOCS - OBJECTIVE STATEMENT
40 y/o male with a PMHx of Asthma, presents to the ED c/o sinus congestion x 1 day. States he has a post nasal drip. States he has chills starting this morning. Denies fever. States he used a nasal spray with no relief. Takes Allegra daily.

## 2019-10-08 NOTE — ED STATDOCS - PROGRESS NOTE DETAILS
38 y/o Male reports to ED c/o sinus congestion and post nasal drip for 36 hours.  Denies fevers, stiff neck , HA, vomiting.  Reports chills earlier.  On exam, NT sinuses to per percussion.   Neg meningeal signs.  CTA B.  RRR.  Will dc home.  Pt instructed to buy Claritin -D or Allegra D from behind pharmacy counter to offer decongestion with antihistamine.  Start Flonase too.  F/U with PMD/ allergist (Patty)

## 2020-09-02 ENCOUNTER — TRANSCRIPTION ENCOUNTER (OUTPATIENT)
Age: 40
End: 2020-09-02

## 2020-11-19 ENCOUNTER — EMERGENCY (EMERGENCY)
Facility: HOSPITAL | Age: 40
LOS: 0 days | Discharge: HOME | End: 2020-11-19
Attending: EMERGENCY MEDICINE
Payer: MEDICAID

## 2020-11-19 VITALS — HEIGHT: 71.5 IN | WEIGHT: 175.05 LBS

## 2020-11-19 VITALS
TEMPERATURE: 98 F | SYSTOLIC BLOOD PRESSURE: 148 MMHG | HEART RATE: 68 BPM | OXYGEN SATURATION: 100 % | DIASTOLIC BLOOD PRESSURE: 75 MMHG | RESPIRATION RATE: 18 BRPM

## 2020-11-19 DIAGNOSIS — M79.661 PAIN IN RIGHT LOWER LEG: ICD-10-CM
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
## 2020-11-19 DIAGNOSIS — M79.662 PAIN IN LEFT LOWER LEG: ICD-10-CM

## 2020-11-19 DIAGNOSIS — S86.911A STRAIN OF UNSPECIFIED MUSCLE(S) AND TENDON(S) AT LOWER LEG LEVEL, RIGHT LEG, INITIAL ENCOUNTER: ICD-10-CM
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
## 2020-11-19 DIAGNOSIS — J45.909 UNSPECIFIED ASTHMA, UNCOMPLICATED: ICD-10-CM

## 2020-11-19 PROCEDURE — 73590 X-RAY EXAM OF LOWER LEG: CPT | Mod: 26,50

## 2020-11-19 PROCEDURE — 73590 X-RAY EXAM OF LOWER LEG: CPT | Mod: 50

## 2020-11-19 PROCEDURE — 99283 EMERGENCY DEPT VISIT LOW MDM: CPT | Mod: 25

## 2020-11-19 PROCEDURE — 99283 EMERGENCY DEPT VISIT LOW MDM: CPT

## 2020-11-19 RX ORDER — IBUPROFEN 200 MG
400 TABLET ORAL ONCE
Refills: 0 | Status: COMPLETED | OUTPATIENT
Start: 2020-11-19 | End: 2020-11-19

## 2020-11-19 RX ORDER — IBUPROFEN 200 MG
1 TABLET ORAL
Qty: 20 | Refills: 0
Start: 2020-11-19 | End: 2020-11-23

## 2020-11-19 RX ADMIN — Medication 400 MILLIGRAM(S): at 15:11

## 2020-11-19 NOTE — ED STATDOCS - CARE PLAN
Principal Discharge DX:	Leg pain, anterior, right  Secondary Diagnosis:	Muscle strain of right lower leg, initial encounter  Secondary Diagnosis:	Pain of left lower extremity

## 2020-11-19 NOTE — ED STATDOCS - PATIENT PORTAL LINK FT
You can access the FollowMyHealth Patient Portal offered by [facility] by registering at the following website: [email or web address]. By joining [employer]’s FollowMyHealth portal, you will also be able to view your health information using other applications (apps) compatible with our system.

## 2020-11-19 NOTE — ED ADULT TRIAGE NOTE - CHIEF COMPLAINT QUOTE
Patient presents to ED complaining of b/l skin pain starting yesterday. Pt denies injury or trauma. No medication taken PTA. Pt ambulatory into triage with steady gait.

## 2020-11-19 NOTE — ED STATDOCS - ADDITIONAL NOTES AND INSTRUCTIONS:
Mr. Johnson was evaluated in our ED today, 11/19/2020.  He will need to rest his legs, apply heat, stretch and continue meds over the next few days.  He should be able to return to work on Monday, November 23, 2020.

## 2020-11-19 NOTE — ED STATDOCS - CLINICAL SUMMARY MEDICAL DECISION MAKING FREE TEXT BOX
41 y/o male with history of shin splints c/o b\l leg pain, similar to previous episode of shin splints. X-ray, Motrin, reassess. Pt advised on importance of proper footwear for standing.

## 2020-11-19 NOTE — ED STATDOCS - PROGRESS NOTE DETAILS
41 y/o male with PMHx of Shin splints "years and years ago" presents to the ED c/o sudden onset pain to bilateral shins since yesterday. Pt states pain began while walking, but pt notes he is an everyday walker and this is not unusual for him. Pt describes pain as sharp and stabbing.  No lesions to LE.  One, small varicosity to posterior calf bilat.  NT to palp veins.  Neg calf swelling.  NT calf to palp.  NT tibia to palp.  Tender R muscle lateral to tibia.  Full AROM ankles, knees bilat.  NVI Le.  Steady gait.  Xrays without Fx.  ? Cyst to R upper tibia on lateral view.  Will dc home with Nsaids, heat, rest. F/U with PMD.  Hermelinda Savage PA-C

## 2020-11-19 NOTE — ED STATDOCS - ATTENDING CONTRIBUTION TO CARE
I, Christine Ragland MD, performed the initial face to face bedside interview with this patient regarding history of present illness, review of symptoms and relevant past medical, social and family history.  I completed an independent physical examination.  I was the initial provider who evaluated this patient. I have signed out the follow up of any pending tests (i.e. labs, radiological studies) to the ACP.  I have communicated the patient’s plan of care and disposition with the ACP.  The history, relevant review of systems, past medical and surgical history, medical decision making, and physical examination was documented by the scribe in my presence and I attest to the accuracy of the documentation.

## 2020-11-19 NOTE — ED STATDOCS - NSFOLLOWUPINSTRUCTIONS_ED_ALL_ED_FT
Muscle Strain      A muscle strain is an injury that happens when a muscle is stretched longer than normal. This can happen during a fall, sports, or lifting. This can tear some muscle fibers. Usually, recovery from muscle strain takes 1–2 weeks. Complete healing normally takes 5–6 weeks.  This condition is first treated with PRICE therapy. This involves:  •Protecting your muscle from being injured again.      •Resting your injured muscle.      •Icing your injured muscle.      •Applying pressure (compression) to your injured muscle. This may be done with a splint or elastic bandage.      •Raising (elevating) your injured muscle.      Your doctor may also recommend medicine for pain.      Follow these instructions at home:    If you have a splint:     •Wear the splint as told by your doctor. Take it off only as told by your doctor.      •Loosen the splint if your fingers or toes tingle, get numb, or turn cold and blue.      •Keep the splint clean.    •If the splint is not waterproof:  •Do not let it get wet.      •Cover it with a watertight covering when you take a bath or a shower.          Managing pain, stiffness, and swelling    •If directed, put ice on your injured area.  •If you have a removable splint, take it off as told by your doctor.      •Put ice in a plastic bag.      •Place a towel between your skin and the bag.      •Leave the ice on for 20 minutes, 2–3 times a day.        •Move your fingers or toes often. This helps to avoid stiffness and lessen swelling.      •Raise your injured area above the level of your heart while you are sitting or lying down.      •Wear an elastic bandage as told by your doctor. Make sure it is not too tight.      General instructions     •Take over-the-counter and prescription medicines only as told by your doctor.      •Limit your activity. Rest your injured muscle as told by your doctor. Your doctor may say that gentle movements are okay.      •If physical therapy was prescribed, do exercises as told by your doctor.      • Do not put pressure on any part of the splint until it is fully hardened. This may take many hours.      • Do not use any products that contain nicotine or tobacco, such as cigarettes and e-cigarettes. These can delay bone healing. If you need help quitting, ask your doctor.      •Warm up before you exercise. This helps to prevent more muscle strains.      •Ask your doctor when it is safe to drive if you have a splint.      •Keep all follow-up visits as told by your doctor. This is important.        Contact a doctor if:    •You have more pain or swelling in your injured area.        Get help right away if:  •You have any of these problems in your injured area:  •You have numbness.      •You have tingling.      •You lose a lot of strength.          Summary    •A muscle strain is an injury that happens when a muscle is stretched longer than normal.      •This condition is first treated with PRICE therapy. This includes protecting, resting, icing, adding pressure, and raising your injury.      •Limit your activity. Rest your injured muscle as told by your doctor. Your doctor may say that gentle movements are okay.      •Warm up before you exercise. This helps to prevent more muscle strains.      This information is not intended to replace advice given to you by your health care provider. Make sure you discuss any questions you have with your health care provider.      Document Revised: 02/13/2020 Document Reviewed: 01/24/2018    Elsevier Patient Education © 2020 [employer]

## 2021-02-02 NOTE — ED PROVIDER NOTE - NS ED MD DISPO DISCHARGE CCDA
Roberta Gary, your video swallow study is normal showing no abnormalities seen. Please call with any questions. Patient/Caregiver provided printed discharge information.

## 2021-03-04 ENCOUNTER — TRANSCRIPTION ENCOUNTER (OUTPATIENT)
Age: 41
End: 2021-03-04

## 2021-03-16 NOTE — ED STATDOCS - CHPI ED QUALITY
Daughter called today with regards to the following request:    Daughter states that patient needs an discharge order for oxygen so it can be picked up. Daughter would like a call back    How does order need to be sent: Fax :  [phone]    For additional information, or if you need to contact the patient, please call patient at the following number:    Contact Phone Number: [phone]    Daughter states that it is okay to leave a detailed message.  
Mailed      Please mail the written dc order for oxygen.  
Spoke to daughter who states mother has not used oxygen since December and the only way it can get picked up is if she gets a discharge order from MD. Daughter informed Dr Beauchamp is out of the office until 3/15 she states this is fine to wait. PCP please advise   
SHARP

## 2021-03-17 ENCOUNTER — TRANSCRIPTION ENCOUNTER (OUTPATIENT)
Age: 41
End: 2021-03-17

## 2021-07-03 ENCOUNTER — TRANSCRIPTION ENCOUNTER (OUTPATIENT)
Age: 41
End: 2021-07-03

## 2021-08-11 NOTE — ED STATDOCS - ATTENDING CONTRIBUTION TO CARE
stated
I personally saw the patient with the ACP, and completed the key components of the history and physical exam. I then discussed the management plan with the ACP.

## 2021-11-12 NOTE — ED STATDOCS - RESPIRATORY [+], MLM
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

## 2022-04-12 ENCOUNTER — EMERGENCY (EMERGENCY)
Facility: HOSPITAL | Age: 42
LOS: 0 days | Discharge: HOME | End: 2022-04-12
Attending: EMERGENCY MEDICINE
Payer: MEDICAID

## 2022-04-12 VITALS
HEART RATE: 86 BPM | OXYGEN SATURATION: 99 % | TEMPERATURE: 98 F | DIASTOLIC BLOOD PRESSURE: 106 MMHG | RESPIRATION RATE: 18 BRPM | SYSTOLIC BLOOD PRESSURE: 151 MMHG

## 2022-04-12 VITALS — HEIGHT: 71.5 IN

## 2022-04-12 DIAGNOSIS — J45.909 UNSPECIFIED ASTHMA, UNCOMPLICATED: ICD-10-CM

## 2022-04-12 DIAGNOSIS — M79.671 PAIN IN RIGHT FOOT: ICD-10-CM

## 2022-04-12 PROCEDURE — 99283 EMERGENCY DEPT VISIT LOW MDM: CPT

## 2022-04-12 PROCEDURE — 73630 X-RAY EXAM OF FOOT: CPT | Mod: 26,RT

## 2022-04-12 PROCEDURE — 99283 EMERGENCY DEPT VISIT LOW MDM: CPT | Mod: 25

## 2022-04-12 PROCEDURE — 73630 X-RAY EXAM OF FOOT: CPT | Mod: RT

## 2022-04-12 RX ORDER — IBUPROFEN 200 MG
600 TABLET ORAL ONCE
Refills: 0 | Status: COMPLETED | OUTPATIENT
Start: 2022-04-12 | End: 2022-04-12

## 2022-04-12 RX ADMIN — Medication 600 MILLIGRAM(S): at 12:50

## 2022-04-12 NOTE — ED STATDOCS - OBJECTIVE STATEMENT
40 yo male w/PMH of asthma presenting with x1.5 weeks of right heel pain. Denies trauma accident or injury. States it feels similar to previous plantar fascitis. Pt has been taking aleve with no improvement of symptoms. Able to bear weight on foot but with pain. No wounds to right foot. Pt states he is a runner but has not increased mileage or intensity of exercise. Pt does wear orthotic shoes. Pt states he is on his feet all day at work.

## 2022-04-12 NOTE — ED STATDOCS - ATTENDING CONTRIBUTION TO CARE
I, Biju Beach MD, personally saw the patient with the resident, and completed the key components of the history and physical exam. I then discussed the management plan with the resident.

## 2022-04-12 NOTE — ED ADULT NURSE NOTE - OBJECTIVE STATEMENT
Pt presents to ER c/o right foot pain. Onset of symptoms began one week PTA. Denies injury/fall. Gait steady. AO x 3

## 2022-04-12 NOTE — ED STATDOCS - MUSCULOSKELETAL, MLM
right heel TTP, no deformity, no overlying skin changes, ROM normal, right heel TTP, no deformity, no overlying skin changes, ROM normal,  distal n/v/m intact

## 2022-04-12 NOTE — ED STATDOCS - PATIENT PORTAL LINK FT
You can access the FollowMyHealth Patient Portal offered by [facility] by registering at the following website: [email or web address]. By joining [employer]’s FollowMyHealth portal, you will also be able to view your health information using other applications (apps) compatible with our system.

## 2022-04-12 NOTE — ED STATDOCS - CLINICAL SUMMARY MEDICAL DECISION MAKING FREE TEXT BOX
Pt likely with plantar fascitis. Will x-ray r/o calcaneus fracture or stress fracture. Will treat with Motrin. If x-ray negative will give podiatry f/u.

## 2022-04-12 NOTE — ED STATDOCS - NSFOLLOWUPINSTRUCTIONS_ED_ALL_ED_FT
Plantar Fasciitis       Plantar fasciitis is a painful foot condition that affects the heel. It occurs when the band of tissue that connects the toes to the heel bone (plantar fascia) becomes irritated. This can happen as the result of exercising too much or doing other repetitive activities (overuse injury).    Plantar fasciitis can cause mild irritation to severe pain that makes it difficult to walk or move. The pain is usually worse in the morning after sleeping, or after sitting or lying down for a period of time. Pain may also be worse after long periods of walking or standing.      What are the causes?    This condition may be caused by:  •Standing for long periods of time.      •Wearing shoes that do not have good arch support.      •Doing activities that put stress on joints (high-impact activities). This includes ballet and exercise that makes your heart beat faster (aerobic exercise), such as running.      •Being overweight.      •An abnormal way of walking (gait).      •Tight muscles in the back of your lower leg (calf).      •High arches in your feet or flat feet.      •Starting a new athletic activity.        What are the signs or symptoms?    The main symptom of this condition is heel pain. Pain may get worse after the following:  •Taking the first steps after a time of rest, especially in the morning after awakening, or after you have been sitting or lying down for a while.      •Long periods of standing still.      Pain may decrease after 30–45 minutes of activity, such as gentle walking.      How is this diagnosed?    This condition may be diagnosed based on your medical history, a physical exam, and your symptoms. Your health care provider will check for:  •A tender area on the bottom of your foot.      •A high arch in your foot or flat feet.      •Pain when you move your foot.      •Difficulty moving your foot.      You may have imaging tests to confirm the diagnosis, such as:  •X-rays.      •Ultrasound.      •MRI.        How is this treated?    Treatment for plantar fasciitis depends on how severe your condition is. Treatment may include:  •Rest, ice, pressure (compression), and raising (elevating) the affected foot. This is called RICE therapy. Your health care provider may recommend RICE therapy along with over-the-counter pain medicines to manage your pain.      •Exercises to stretch your calves and your plantar fascia.      •A splint that holds your foot in a stretched, upward position while you sleep (night splint).      •Physical therapy to relieve symptoms and prevent problems in the future.      •Injections of steroid medicine (cortisone) to relieve pain and inflammation.      •Stimulating your plantar fascia with electrical impulses (extracorporeal shock wave therapy). This is usually the last treatment option before surgery.      •Surgery, if other treatments have not worked after 12 months.        Follow these instructions at home:      Managing pain, stiffness, and swelling    •If directed, put ice on the painful area. To do this:  •Put ice in a plastic bag, or use a frozen bottle of water.      •Place a towel between your skin and the bag or bottle.      •Roll the bottom of your foot over the bag or bottle.      •Do this for 20 minutes, 2–3 times a day.        •Wear athletic shoes that have air-sole or gel-sole cushions, or try soft shoe inserts that are designed for plantar fasciitis.      •Elevate your foot above the level of your heart while you are sitting or lying down.      Activity     •Avoid activities that cause pain. Ask your health care provider what activities are safe for you.      •Do physical therapy exercises and stretches as told by your health care provider.      •Try activities and forms of exercise that are easier on your joints (low impact). Examples include swimming, water aerobics, and biking.      General instructions     •Take over-the-counter and prescription medicines only as told by your health care provider.      •Wear a night splint while sleeping, if told by your health care provider. Loosen the splint if your toes tingle, become numb, or turn cold and blue.      •Maintain a healthy weight, or work with your health care provider to lose weight as needed.      •Keep all follow-up visits. This is important.        Contact a health care provider if you have:    •Symptoms that do not go away with home treatment.      •Pain that gets worse.      •Pain that affects your ability to move or do daily activities.        Summary    •Plantar fasciitis is a painful foot condition that affects the heel. It occurs when the band of tissue that connects the toes to the heel bone (plantar fascia) becomes irritated.      •Heel pain is the main symptom of this condition. It may get worse after exercising too much or standing still for a long time.      •Treatment varies, but it usually starts with rest, ice, pressure (compression), and raising (elevating) the affected foot. This is called RICE therapy. Over-the-counter medicines can also be used to manage pain.      This information is not intended to replace advice given to you by your health care provider. Make sure you discuss any questions you have with your health care provider.

## 2022-04-12 NOTE — ED ADULT TRIAGE NOTE - CHIEF COMPLAINT QUOTE
c/o right foot pain for past week, denies acute injury or swelling to site, has not taken pain relieving medication

## 2022-05-06 ENCOUNTER — EMERGENCY (EMERGENCY)
Facility: HOSPITAL | Age: 42
LOS: 0 days | Discharge: HOME | End: 2022-05-06
Attending: EMERGENCY MEDICINE
Payer: MEDICAID

## 2022-05-06 VITALS
WEIGHT: 179.9 LBS | TEMPERATURE: 98 F | DIASTOLIC BLOOD PRESSURE: 90 MMHG | HEART RATE: 76 BPM | OXYGEN SATURATION: 100 % | RESPIRATION RATE: 18 BRPM | SYSTOLIC BLOOD PRESSURE: 156 MMHG | HEIGHT: 71.5 IN

## 2022-05-06 DIAGNOSIS — N34.2 OTHER URETHRITIS: ICD-10-CM

## 2022-05-06 DIAGNOSIS — J45.909 UNSPECIFIED ASTHMA, UNCOMPLICATED: ICD-10-CM

## 2022-05-06 DIAGNOSIS — R36.9 URETHRAL DISCHARGE, UNSPECIFIED: ICD-10-CM

## 2022-05-06 DIAGNOSIS — R82.998 OTHER ABNORMAL FINDINGS IN URINE: ICD-10-CM

## 2022-05-06 DIAGNOSIS — R30.0 DYSURIA: ICD-10-CM

## 2022-05-06 DIAGNOSIS — Z87.440 PERSONAL HISTORY OF URINARY (TRACT) INFECTIONS: ICD-10-CM

## 2022-05-06 DIAGNOSIS — M54.9 DORSALGIA, UNSPECIFIED: ICD-10-CM

## 2022-05-06 LAB
APPEARANCE UR: ABNORMAL
BILIRUB UR-MCNC: NEGATIVE — SIGNIFICANT CHANGE UP
COLOR SPEC: YELLOW — SIGNIFICANT CHANGE UP
DIFF PNL FLD: ABNORMAL
GLUCOSE UR QL: NEGATIVE — SIGNIFICANT CHANGE UP
KETONES UR-MCNC: NEGATIVE — SIGNIFICANT CHANGE UP
LEUKOCYTE ESTERASE UR-ACNC: ABNORMAL
NITRITE UR-MCNC: NEGATIVE — SIGNIFICANT CHANGE UP
PH UR: 7 — SIGNIFICANT CHANGE UP (ref 5–8)
PROT UR-MCNC: NEGATIVE — SIGNIFICANT CHANGE UP
SP GR SPEC: 1.01 — SIGNIFICANT CHANGE UP (ref 1.01–1.02)
UROBILINOGEN FLD QL: NEGATIVE — SIGNIFICANT CHANGE UP

## 2022-05-06 PROCEDURE — 99284 EMERGENCY DEPT VISIT MOD MDM: CPT

## 2022-05-06 PROCEDURE — 87491 CHLMYD TRACH DNA AMP PROBE: CPT

## 2022-05-06 PROCEDURE — 87086 URINE CULTURE/COLONY COUNT: CPT

## 2022-05-06 PROCEDURE — 99283 EMERGENCY DEPT VISIT LOW MDM: CPT

## 2022-05-06 PROCEDURE — 81001 URINALYSIS AUTO W/SCOPE: CPT

## 2022-05-06 PROCEDURE — 96372 THER/PROPH/DIAG INJ SC/IM: CPT

## 2022-05-06 PROCEDURE — 87591 N.GONORRHOEAE DNA AMP PROB: CPT

## 2022-05-06 RX ORDER — CEFTRIAXONE 500 MG/1
500 INJECTION, POWDER, FOR SOLUTION INTRAMUSCULAR; INTRAVENOUS ONCE
Refills: 0 | Status: COMPLETED | OUTPATIENT
Start: 2022-05-06 | End: 2022-05-06

## 2022-05-06 RX ADMIN — CEFTRIAXONE 500 MILLIGRAM(S): 500 INJECTION, POWDER, FOR SOLUTION INTRAMUSCULAR; INTRAVENOUS at 19:35

## 2022-05-06 RX ADMIN — Medication 100 MILLIGRAM(S): at 19:35

## 2022-05-06 NOTE — ED STATDOCS - CLINICAL SUMMARY MEDICAL DECISION MAKING FREE TEXT BOX
42 M presents to the ED with burning with urination and white-reina discharge. no fevers. exam non focal. will check for STI, UTI. no testicular pain. no concern for epididymitis, prostatitis. declines HIV testing.

## 2022-05-06 NOTE — ED ADULT TRIAGE NOTE - IDEAL BODY WEIGHT(KG)
After Visit Summary   7/10/2017    José Luis Harrison    MRN: [id number]           Patient Information     Date Of Birth          1949        Visit Information        Provider Department      7/10/2017 10:00 AM Jose Carrillo MD [facility]        Today's Diagnoses     Cerebral artery occlusion with cerebral infarction (H)    -  1    Essential hypertension with goal blood pressure less than 140/90        Hyperlipidemia with target LDL less than 100           Follow-ups after your visit        Your next 10 appointments already scheduled     Jul 11, 2017 10:15 AM CDT   CT LUNG RESEARCH MILES with [facility]    [address] [address] [id number] [phone]           Please bring any scans or X-rays taken at other hospitals, if similar tests were done. Also bring a list of your medicines, including vitamins, minerals and over-the-counter drugs. It is safest to leave personal items at home.  Be sure to tell your doctor:   If you have any allergies.   If there s any chance you are pregnant.   If you are breastfeeding.   If you have any special needs.  You do not need to do anything special to prepare.  Please wear loose clothing, such as a sweat suit or jogging clothes. Avoid snaps, zippers and other metal. We may ask you to undress and put on a hospital gown.              Future tests that were ordered for you today     Open Future Orders        Priority Expected Expires Ordered    Basic metabolic panel Routine  7/10/2018 7/10/2017            Who to contact     If you have questions or need follow up information about today's clinic visit or your schedule please contact [facility] directly at [phone].  Normal or non-critical lab and imaging results will be communicated to you by MyChart, letter or phone within 4 business days after the clinic has received the results. If you do not hear from us within 7  days, please contact the clinic through [employer] or phone. If you have a critical or abnormal lab result, we will notify you by phone as soon as possible.  Submit refill requests through [employer] or call your pharmacy and they will forward the refill request to us. Please allow 3 business days for your refill to be completed.          Additional Information About Your Visit        [employer] Information     [employer] gives you secure access to your electronic health record. If you see a primary care provider, you can also send messages to your care team and make appointments. If you have questions, please call your primary care clinic.  If you do not have a primary care provider, please call [phone] and they will assist you.        Care EveryWhere ID     This is your Care EveryWhere ID. This could be used by other organizations to access your [facility] medical records  [id number]        Your Vitals Were     Pulse Temperature Pulse Oximetry BMI (Body Mass Index)          62 97.4  F (36.3  C) (Oral) 100% 24.71 kg/m2         Blood Pressure from Last 3 Encounters:   07/10/17 121/70   03/20/17 134/75   01/10/17 120/78    Weight from Last 3 Encounters:   07/10/17 155 lb 6.4 oz (70.5 kg)   03/20/17 161 lb 12.8 oz (73.4 kg)   01/10/17 156 lb (70.8 kg)              We Performed the Following     Albumin Random Urine Quantitative     Lipid panel reflex to direct LDL          Where to get your medicines      These medications were sent to [id number] - [place], [place] - [address]  [address] [address] [facility] [address]     Phone:  [phone]     amLODIPine 10 MG tablet    hydrochlorothiazide 25 MG tablet    losartan 25 MG tablet    simvastatin 20 MG tablet          Primary Care Provider Office Phone # Fax #    Jose Carrillo [phone]       [facility] [address] [facility] [address]        Equal Access to Services     RIOS MIR AH: Yanira haider  Alejandra, isaacda luqadaha, qaybta kaalrob fabian, kenyatta tommyin hayaan rachellekelsea monijennifer laTracycrissy donal. So [facility] [phone].    ATENCIÓN: Si deon anderson, tiene a ness disposición servicios gratuitos de asistencia lingüística. Bethany al [phone].    We comply with applicable federal civil rights laws and [place] laws. We do not discriminate on the basis of race, color, national origin, age, disability sex, sexual orientation or gender identity.            Thank you!     Thank you for [facility]  [facility]. Our goal is always to provide you with excellent care. Hearing back from our patients is one way we can continue to improve our services. Please take a few minutes to complete the written survey that you may receive in the mail after your visit with us. Thank you!             Your Updated Medication List - Protect others around you: Learn how to safely use, store and throw away your medicines at www.disposemymeds.org.          This list is accurate as of: 7/10/17 10:08 AM.  Always use your most recent med list.                   Brand Name Dispense Instructions for use Diagnosis    amLODIPine 10 MG tablet    NORVASC    90 tablet    Take 1 tablet (10 mg) by mouth daily    Essential hypertension with goal blood pressure less than 140/90       aspirin 325 MG EC tablet      Take 325 mg by mouth daily.        hydrochlorothiazide 25 MG tablet    HYDRODIURIL    90 tablet    Take 1 tablet (25 mg) by mouth daily    Essential hypertension with goal blood pressure less than 140/90       losartan 25 MG tablet    COZAAR    90 tablet    Take 1 tablet (25 mg) by mouth daily    Essential hypertension with goal blood pressure less than 140/90       simvastatin 20 MG tablet    ZOCOR    90 tablet    Take 1 tablet (20 mg) by mouth At Bedtime    Hyperlipidemia with target LDL less than 100          76

## 2022-05-06 NOTE — ED STATDOCS - ATTENDING APP SHARED VISIT CONTRIBUTION OF CARE
I, Kush Jacob MD, personally saw the patient with ACP.  I have personally performed a face to face diagnostic evaluation on this patient.  I have reviewed the ACP note and agree with the history, exam, and plan of care, except as noted.   The initial assessment was performed by myself and then the patient was handed off to the ACP. The patient was followed and re-evaluated by the ACP. All labs, imaging and procedures were evaluated and performed by the ACP and I was available for consultation if any questions in the patients care came up.

## 2022-05-06 NOTE — ED ADULT TRIAGE NOTE - CHIEF COMPLAINT QUOTE
c/o pain when urinating started yesterday, patient states he noticed white discharged from penis, c/o associated lower back pain and lower abdominal pain, reports some urine retention, denies fever HX; denies

## 2022-05-06 NOTE — ED STATDOCS - PHYSICAL EXAMINATION
Constitutional: NAD AAOx3  Eyes: PERRLA EOMI  Head: Normocephalic atraumatic  Mouth: MMM  Cardiac: regular rate   Resp: Lungs CTAB  GI: Abd s/nt/nd  Neuro: CN2-12 intact  Skin: No visible rashes Constitutional: NAD AAOx3  Eyes: PERRLA EOMI  Head: Normocephalic atraumatic  Mouth: MMM  Cardiac: regular rate   Resp: Lungs CTAB  GI: Abd s/nt/nd no cvat  Neuro: CN2-12 intact  Skin: No visible rashes

## 2022-05-06 NOTE — ED STATDOCS - NS ED ATTENDING STATEMENT MOD
This was a shared visit with the DEBBIE. I reviewed and verified the documentation and independently performed the documented:

## 2022-05-06 NOTE — ED STATDOCS - NS_ ATTENDINGSCRIBEDETAILS _ED_A_ED_FT
I, Kush Jacob MD,  performed the initial face to face bedside interview with this patient regarding history of present illness, review of symptoms and relevant past medical, social and family history.  I completed an independent physical examination.  I was the initial provider who evaluated this patient.   I personally saw the patient and performed a substantive portion of the visit including all aspects of the medical decision making.  The history, relevant review of systems, past medical and surgical history, medical decision making, and physical examination was documented by the scribe in my presence and I attest to the accuracy of the documentation.

## 2022-05-06 NOTE — ED STATDOCS - PATIENT PORTAL LINK FT
You can access the FollowMyHealth Patient Portal offered by [facility] by registering at the following website: [email or web address]. By joining [facility]’s FollowMyHealth portal, you will also be able to view your health information using other applications (apps) compatible with our system.

## 2022-05-06 NOTE — ED STATDOCS - PROGRESS NOTE DETAILS
43 y/o Male presents to ED c/o dysuria and white penile dc for 1 day.  U/A with evidence of infection with leuk esterase, 10 -25 WBC and blood.  Pt refused syphillis testing.  I informed pt of u/a findings and plan to send urine for culture and GC/Chlamydia.  IM rocephin and PO Doxy ordered.  Will dc home with Doxy BID.  F/U with PMD.  Hermelinda Savage PA-C

## 2022-05-06 NOTE — ED STATDOCS - OBJECTIVE STATEMENT
42 M no reported past medical history here complaining dysuria and white penile discharge that started yesterday.  also reports associated abdominal fullness and back pain. no reported fever. denies penile rash. pt states he is sexually active and endorses remote hx of STDs. states that he uses condoms and is agreeable to be tested for STDs today. denies testicular pain.  pt states that he did have a UTI previously. endorses slight rectal pressure also. non smoker. no etoh drinker. NKDA. 42 M no reported past medical history here complaining dysuria and white penile discharge that started yesterday.  also reports associated abdominal fullness and back pain. no reported fever. denies penile rash. pt states he is sexually active and endorses remote hx of STDs. states that he uses condoms and is agreeable to be tested for STDs today. denies testicular pain.  pt states that he did have a UTI previously. endorses slight rectal pressure also. non smoker. no etoh drinker. NKDA. declines HIV and syphilis testing

## 2022-05-07 LAB
C TRACH RRNA SPEC QL NAA+PROBE: SIGNIFICANT CHANGE UP
CULTURE RESULTS: NO GROWTH — SIGNIFICANT CHANGE UP
N GONORRHOEA RRNA SPEC QL NAA+PROBE: DETECTED
SPECIMEN SOURCE: SIGNIFICANT CHANGE UP
SPECIMEN SOURCE: SIGNIFICANT CHANGE UP

## 2022-05-08 NOTE — ED POST DISCHARGE NOTE - DETAILS
I spoke with patient and he will contact all sexual partners be tested.  Will Fu with his PMD.  Saray HERNANDEZ

## 2022-05-12 NOTE — ED STATDOCS - NS_ATTENDINGSCRIBE_ED_ALL_ED
I personally performed the service described in the documentation recorded by the scribe in my presence, and it accurately and completely records my words and actions. marijuana

## 2022-06-29 ENCOUNTER — EMERGENCY (EMERGENCY)
Facility: HOSPITAL | Age: 42
LOS: 0 days | Discharge: HOME | End: 2022-06-30
Attending: EMERGENCY MEDICINE
Payer: COMMERCIAL

## 2022-06-29 VITALS — WEIGHT: 179.9 LBS | HEIGHT: 71 IN

## 2022-06-29 DIAGNOSIS — M54.9 DORSALGIA, UNSPECIFIED: ICD-10-CM

## 2022-06-29 DIAGNOSIS — M54.2 CERVICALGIA: ICD-10-CM
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
## 2022-06-29 DIAGNOSIS — J45.909 UNSPECIFIED ASTHMA, UNCOMPLICATED: ICD-10-CM
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
## 2022-06-29 PROCEDURE — 99285 EMERGENCY DEPT VISIT HI MDM: CPT

## 2022-06-29 PROCEDURE — 99284 EMERGENCY DEPT VISIT MOD MDM: CPT | Mod: 25

## 2022-06-29 PROCEDURE — 72128 CT CHEST SPINE W/O DYE: CPT | Mod: MA

## 2022-06-29 PROCEDURE — 72131 CT LUMBAR SPINE W/O DYE: CPT | Mod: MA

## 2022-06-29 PROCEDURE — 99053 MED SERV 10PM-8AM 24 HR FAC: CPT

## 2022-06-29 PROCEDURE — 70450 CT HEAD/BRAIN W/O DYE: CPT | Mod: MA

## 2022-06-29 PROCEDURE — 72125 CT NECK SPINE W/O DYE: CPT | Mod: MA

## 2022-06-29 RX ORDER — ACETAMINOPHEN 500 MG
650 TABLET ORAL ONCE
Refills: 0 | Status: COMPLETED | OUTPATIENT
Start: 2022-06-29 | End: 2022-06-29

## 2022-06-29 RX ORDER — CYCLOBENZAPRINE HYDROCHLORIDE 10 MG/1
10 TABLET, FILM COATED ORAL ONCE
Refills: 0 | Status: COMPLETED | OUTPATIENT
Start: 2022-06-29 | End: 2022-06-29
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
## 2022-06-29 NOTE — ED STATDOCS - CLINICAL SUMMARY MEDICAL DECISION MAKING FREE TEXT BOX
imaging obtained, pt given tylenol/flexeril. imaging obtained, pt given tylenol/flexeril. Pt will be signed out to the oncoming physician for re-evaluation and final disposition.
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
## 2022-06-29 NOTE — ED STATDOCS - NS ED ROS FT
Constitutional: No fever or chills  Eyes: No visual changes  HEENT: No throat pain  CV: No chest pain  Resp: No SOB no cough  GI: No abd pain, nausea or vomiting  : No dysuria  MSK: + neck and back pain  Skin: No rash  Neuro: No headache

## 2022-06-29 NOTE — ED STATDOCS - PATIENT PORTAL LINK FT
You can access the FollowMyHealth Patient Portal offered by [facility] by registering at the following website: [email or web address]. By joining [facility]’s FollowMyHealth portal, you will also be able to view your health information using other applications (apps) compatible with our system.

## 2022-06-29 NOTE — ED STATDOCS - PHYSICAL EXAMINATION
Constitutional: NAD AAOx3  Eyes: PERRL, EOMI  Head: Normocephalic atraumatic, +TTP of paraspinal neck  Mouth: MMM  Cardiac: regular rate   Resp: Lungs CTAB  GI: Abd s/nt/nd  Neuro: CN2-12 intact  Back: +TTP of thoracic and lumbar spine, no step off's, negative straight leg b/l  Extremities: Intact distal pulses b/l, no calf tenderness, normal ROM b/l UE and LE   Skin: No rashes

## 2022-06-30 VITALS
OXYGEN SATURATION: 96 % | RESPIRATION RATE: 18 BRPM | TEMPERATURE: 98 F | DIASTOLIC BLOOD PRESSURE: 81 MMHG | SYSTOLIC BLOOD PRESSURE: 139 MMHG | HEART RATE: 61 BPM

## 2022-06-30 PROCEDURE — 72128 CT CHEST SPINE W/O DYE: CPT | Mod: 26,MA

## 2022-06-30 PROCEDURE — 70450 CT HEAD/BRAIN W/O DYE: CPT | Mod: 26,MA

## 2022-06-30 PROCEDURE — 72125 CT NECK SPINE W/O DYE: CPT | Mod: 26,MA

## 2022-06-30 PROCEDURE — 72131 CT LUMBAR SPINE W/O DYE: CPT | Mod: 26,MA

## 2022-06-30 RX ADMIN — CYCLOBENZAPRINE HYDROCHLORIDE 10 MILLIGRAM(S): 10 TABLET, FILM COATED ORAL at 00:09

## 2022-06-30 RX ADMIN — Medication 650 MILLIGRAM(S): at 00:10

## 2022-08-20 ENCOUNTER — NON-APPOINTMENT (OUTPATIENT)
Age: 42
End: 2022-08-20

## 2022-09-14 ENCOUNTER — NON-APPOINTMENT (OUTPATIENT)
Age: 42
End: 2022-09-14

## 2023-05-25 NOTE — ED STATDOCS - NS ED ATTENDING STATEMENT MOD
I have personally performed a face to face diagnostic evaluation on this patient. I have reviewed the ACP note and agree with the history, exam and plan of care, except as noted. Clindamycin Counseling: I counseled the patient regarding use of clindamycin as an antibiotic for prophylactic and/or therapeutic purposes. Clindamycin is active against numerous classes of bacteria, including skin bacteria. Side effects may include nausea, diarrhea, gastrointestinal upset, rash, hives, yeast infections, and in rare cases, colitis.

## 2023-05-31 NOTE — ED PROVIDER NOTE - CONDITION AT DISCHARGE:
Satisfactory Rhomboid Transposition Flap Text: The defect edges were debeveled with a #15 scalpel blade.  Given the location of the defect and the proximity to free margins a rhomboid transposition flap was deemed most appropriate.  Using a sterile surgical marker, an appropriate rhomboid flap was drawn incorporating the defect.    The area thus outlined was incised deep to adipose tissue with a #15 scalpel blade.  The skin margins were undermined to an appropriate distance in all directions utilizing iris scissors.

## 2023-06-13 NOTE — ED ADULT NURSE NOTE - BREATH SOUNDS, MLM
Let pt know that test results are essentially unchanged.  Make sure patient continues with daily iron supplement  
Pt is calling back to clinic. Pt confirmed she will be taking Iron daily. No questions or concerns at this time.   
Writer attempted to call patient. Patient did not answer. Detailed voicemail left for pt with results and to call back if she has any questions.   
Clear

## 2023-08-04 NOTE — ED ADULT NURSE NOTE - FINAL NURSING ELECTRONIC SIGNATURE
12-Feb-2018 22:23
[FreeTextEntry1] : Continue all medications as prescribed. Check labs as above. Will adjust any medications based upon lab results.  Reviewed age-appropriate preventative screening tests with patient. She is due for DEXA and several vaccines (Shingrix, Tdap, Pneumococcal). She declines all of these. She is also due for a mammogram and has a presription from Dr. Kidd for this.  Discussed clean eating (eg Mediterranean style eating plan) and regular exercise/staying as physically active as possible.  Include balance exercises and strength training and core strengthening exercises for bone health and to decrease risk for falls.  Reviewed importance of good self care (e.g. meditation, yoga, adequate rest, regular exercise, magnesium, clean eating, etc.).  Follow up for next physical in one year.  Additional time spent addressing new or existing problems, requiring additional work outside of the normal scope of a routine annual exam: 20 minutes.

## 2023-08-04 NOTE — ED ADULT NURSE NOTE - CAS EDP DISCH TYPE
You can access the FollowMyHealth Patient Portal offered by [facility] by registering at the following website: [email or web address]. By joining [facility]’s FollowMyHealth portal, you will also be able to view your health information using other applications (apps) compatible with our system. Home

## 2023-09-08 NOTE — ED STATDOCS - CARE PLAN
Recommend increase miralax to 1 capful daily, take the in the morning time.  Take the fiber supplement in the evening time.  Follow Low FODMAP diet.  Keep an eye on symptoms and if persistent after making these changes we should consider further work-up with either gastric emptying study or SIBO testing.  Follow-up with PCP.  Follow-up 6 months.    Low FODMAP Diet        FODMAP is an acronym for \"fermentable oligo-, di-, and monosachharides and polyols\".  FODMAP's are incompletely absorbed in the small intestine and fermented in the colon.    These foods contain fructose (e.g. Apples, pears, honey, high-fructose corn syrup), lactose (e.g. Milk), fructans or galactans (e.g. Wheat, onions), and polyols (e.g. Some fruits and vegetables, artificial sweeteners such as sorbitol).    For some people, FODMAPs are poorly absorbed and can cause symptoms:  Bloating or swelling in your belly.  Gas.  Belly pain.  Nausea.  Diarrhea (loose stool).  Constipation (hard stool or trouble passing stool).    Remove all high-FODMAP foods from the diet, This phase should only be done for about four weeks, then slowly start to reintroduce foods back into your diet.    Food Group Foods to Eat Foods to Limit   Meats, Poultry,  Fish, Eggs Beef, chicken, canned tuna, eggs, egg whites, fish, lamb, pork, shellfish, turkey, cold cuts Foods made with high FODMAP fruit sauces or what High Fructose Corn Syrup   Dairy Lactose free diary  or  small amounts of: cream cheese, half and half, hard cheeses (cheddar, winter, parmesan, swiss), mozzarella, sherbet Buttermilk, chocolate, cottage cheese, ice cream, creamy/cheesy sauces, milmk (from cow, sheep or goat), sweetened condensed milk, evaporated milk, soft cheeses (brie, ricotta), sour cream, whipped cream, yogurt   Meat, Non-Dairy Alternatives [place] milk, rice milk, rice milk ice cream, nuts, nut butters, seeds Coconut milk, coconut cream, beans, black eyed peas, hummus, lentils, pistachios, soy  products   Grains wheat free grains/wheat free flours (gluten free grains are wheat free):  bagels, breads, hot/cold cereals (corn flakes, cheerios, cream of rice, grits,  oats, etc), crackers, noodles, pastas, quinoa, pancakes, pretzels chicory root, inulin, grains with high fructose corn syrup or made from wheat (terms for wheat: einkorn, emmer, kamut,  spelt), wheat flours (terms for wheat flour: bromated, durum, enriched,  farina, laura, semolina, white flours), flour tortillas, rye   Fruits bananas, berries, cantaloupe,  grapes, grapefruit, honeydew, kiwi, [place], lemon, lime, mandarin, orange, passion fruit, pineapple, rhubarb, tangerine  avocado, apples, applesauce, apricots, dates, canned fruit,  cherries, dried fruits, figs, guava, lychee, simba, nectarines, pears,  papaya, peaches, plums, prunes, persimmon, watermelon    Vegetables bamboo shoots, bell peppers, bokchoy, cucumbers, carrots, celery, corn, eggplant, lettuce, leafy greens  pumpkin, potatoes, squash, yams, (butternut, winter), tomatoes, zucchini artichokes, asparagus, beets, leeks, broccoli, brussel sprouts, cabbage,  cauliflower, fennel, green beans, mushrooms, okra, snow peas, summer squash    Desserts any made with allowed foods  any with HFCS or made with foods to limit    Beverages low FODMAP fruit/vegetable juices (limit to ½ cup at a time), coffee, tea any with HFCS¸ high FODMAP fruit/vegetable juices, fortified parth  (jez wong)    Seasonings, Condiments, and artificial sweeteners most spices and herbs, homemade broth, butter, chives, flaxseed, garlic  flavored oil, garlic powder, olives, margarine, mayonnaise, onion powder, olive oil, pepper, salt, sugar,  maple syrup without HFCS, mustard, low FODMAP salad dressings, soy sauce, marinara sauce (small  amounts), vinegar, balsamic vinegar  HFCS, agave, chutneys, coconut, garlic, honey, jams, jellies, molasses, onions, pickle, relish,  high FODMAP fruit/vegetable sauces, salad  dressings made with  high FODMAPs,   Artificial sweeteners: sorbitol, mannitol, isomalt, xylitol (cough drops, gums, mints)    HFCS = High Fructose Corn Syrup       Principal Discharge DX:	Nausea  Secondary Diagnosis:	Abdominal pain

## 2023-11-28 NOTE — ED STATDOCS - CPE ED CARDIAC NORM
normal... Const:  Alert and interactive, no acute distress  HEENT: Normocephalic, atraumatic; WNL; Moist mucosa; Oropharynx clear; Neck supple. dried blood in R nare, none in L nare.   Lymph: No significant lymphadenopathy  CV: Heart regular, normal S1/2, no murmurs; Extremities WWPx4  Pulm: Lungs clear to auscultation bilaterally. Port side over R chest appears intact.   GI: Abdomen non-distended; No organomegaly, no tenderness, no masses  Skin: No rash noted  Neuro: Alert; Normal tone; coordination appropriate for age

## 2023-12-13 ENCOUNTER — NON-APPOINTMENT (OUTPATIENT)
Age: 43
End: 2023-12-13

## 2024-04-23 ENCOUNTER — EMERGENCY (EMERGENCY)
Facility: HOSPITAL | Age: 44
LOS: 0 days | Discharge: HOME | End: 2024-04-23
Attending: EMERGENCY MEDICINE
Payer: COMMERCIAL

## 2024-04-23 VITALS
SYSTOLIC BLOOD PRESSURE: 133 MMHG | RESPIRATION RATE: 18 BRPM | DIASTOLIC BLOOD PRESSURE: 85 MMHG | WEIGHT: 172.84 LBS | HEART RATE: 62 BPM | OXYGEN SATURATION: 96 % | TEMPERATURE: 98 F

## 2024-04-23 VITALS — HEIGHT: 71 IN

## 2024-04-23 DIAGNOSIS — L53.9 ERYTHEMATOUS CONDITION, UNSPECIFIED: ICD-10-CM

## 2024-04-23 DIAGNOSIS — M79.671 PAIN IN RIGHT FOOT: ICD-10-CM

## 2024-04-23 PROCEDURE — 99283 EMERGENCY DEPT VISIT LOW MDM: CPT | Mod: 25

## 2024-04-23 PROCEDURE — 99284 EMERGENCY DEPT VISIT MOD MDM: CPT

## 2024-04-23 PROCEDURE — 73630 X-RAY EXAM OF FOOT: CPT | Mod: 26,RT

## 2024-04-23 PROCEDURE — 73630 X-RAY EXAM OF FOOT: CPT | Mod: RT

## 2024-04-23 RX ORDER — IBUPROFEN 200 MG
600 TABLET ORAL ONCE
Refills: 0 | Status: COMPLETED | OUTPATIENT
Start: 2024-04-23 | End: 2024-04-23

## 2024-04-23 RX ADMIN — Medication 600 MILLIGRAM(S): at 11:27

## 2024-04-23 NOTE — ED STATDOCS - WR ORDER DATE AND TIME 1
23-Apr-2024 11:02 How Severe Is It?: moderate Is This A New Presentation, Or A Follow-Up?: Follow Up Isotretinoin Additional History: Patient requesting rf of [facility] for spot treatment. \\nIpledge Number: [id number]\\nReported Weight in kilograms: 54.4 kg\\nMonths of Therapy: 2\\n\\nCurrent Dosage (Month 2): 80mg Daily\\nCumulative Dosage: 66 mg/kg

## 2024-04-23 NOTE — ED STATDOCS - NSFOLLOWUPINSTRUCTIONS_ED_ALL_ED_FT
Follow up with the podiatrist. Take Tylenol 650-1000 mg every 6 hours as needed for pain. Do not exceed 4,000 mg in a 24 hour period. Take Ibuprofen 600-800 mg every 6 hours as needed for moderate pain -- take with food. Please return to the ED for new or worsening symptoms.    Foot Pain  Many things can cause foot pain. Common causes include injuries to the foot. The injuries include sprains or broken bones, or injuries that affect the nerves in the feet. Other causes of foot pain include arthritis, blisters, and bunions.    To know what causes your foot pain, your health care provider will take a detailed history of your symptoms. They will also do a physical exam as well as imaging tests, such as X-ray or MRI.    Follow these instructions at home:  Managing pain, stiffness, and swelling    Bag of ice on a towel on the skin.  If told, put ice on the painful area.  Put ice in a plastic bag.  Place a towel between your skin and the bag.  Leave the ice on for 20 minutes, 2–3 times a day.  If your skin turns bright red, remove the ice right away to prevent skin damage. The risk of damage is higher if you cannot feel pain, heat, or cold.  Activity    Do not stand or walk for long periods.  Do stretches to relieve foot pain and stiffness as told by your provider.  Do not lift anything that is heavier than 10 lb (4.5 kg), or the limit that you are told, until your provider says that it is safe. Lifting a lot of weight can put added pressure on your feet.  Return to your normal activities as told by your provider. Ask your provider what activities are safe for you.  Lifestyle    Wear comfortable, supportive shoes that fit you well. Do not wear high heels.  Keep your feet clean and dry.  General instructions    Take over-the-counter and prescription medicines only as told by your provider.  Rub your foot gently.  Pay attention to any changes in your symptoms. Let your provider know if symptoms become worse.  Keep all follow-up visits. Your provider will want to monitor your progress.  Contact a health care provider if:  Your pain does not get better after a few days of treatment at home.  Your pain gets worse.  You cannot stand on your foot.  Your foot or toes are swollen.  Your foot is numb or tingling.  Get help right away if:  Your foot or toes turn white or blue.  You have warmth and redness along your foot.  This information is not intended to replace advice given to you by your health care provider. Make sure you discuss any questions you have with your health care provider.

## 2024-04-23 NOTE — ED STATDOCS - OBJECTIVE STATEMENT
44-year-old male history of flatfeet presents the emergency department with right lateral foot pain.  Patient states that symptoms have been present for about 1 week states he started to develop blister on the right lateral foot and it become more painful to walk so came in for evaluation.  No falls or trauma no fevers.  Exam with mild erythema and tenderness to the right lateral foot at the base of the fifth toe no warmth there is tenderness there is no fluctuance no crepitus normal pulses.  Will x-ray to rule out fracture no signs of cellulitis at this time or infection likely erythema forming from pressure points from shoes patient has not been wearing orthotics or arch supports and has not visited the podiatrist will x-ray pain control will need podiatry follow-up.

## 2024-04-23 NOTE — ED STATDOCS - PHYSICAL EXAMINATION
Constitutional: NAD AAOx3  Eyes: PERRLA EOMI  Head: Normocephalic atraumatic  Mouth: MMM  Cardiac: regular rate   Resp: unlabored breathing  GI: Abd s/nt/nd  MSK: Erythema right lateral foot at base of fifth toe. +TTP. Normal pulses.   Neuro: grossly normal and intact  Skin: No visible rashes Constitutional: NAD AAOx3  Eyes: PERRLA EOMI  Head: Normocephalic atraumatic  Mouth: MMM  Cardiac: regular rate   Resp: unlabored breathing  MSK: Erythema right lateral foot at base of fifth toe. +TTP. Normal pulses. no crepitus no fluctuance no warmth  Neuro: grossly normal and intact  Skin: No visible rashes

## 2024-04-23 NOTE — ED STATDOCS - PROGRESS NOTE DETAILS
44-year-old male with history of flatfeet presents emergency department complaining of right foot pain for 1-1/2 weeks.  Patient denies acute trauma or injury to foot.  Notes she had a blister started to develop on his foot where his pain is and now it hurts to walk.  Has never seen a podiatrist.  Vitals are stable.  PE demonstrates erythema and tenderness to right lateral foot at the base of the fifth toe, neurovascularly intact, full range of motion of foot, rest of exam unremarkable.  Will get x-ray of foot, pain control, reassess.  Anticipate discharge with podiatry follow-up. - Camila Miller PA-C XR reviewed. No bony abnormalities. D/w patient. Pain controlled when sitting down and foot elevated. Will discharge home with pain control and podiatry follow up. Strict return precautions were given. All questions and concerns were addressed. - Camila Miller PA-C

## 2024-04-23 NOTE — ED ADULT TRIAGE NOTE - CHIEF COMPLAINT QUOTE
Pt presents to the ED c/o right foot pain x1.5 weeks. Pt reports swelling and redness along the lateral foot and posterior ankle. Denies injury or fevers. Pt have been taking Tylenol without relief.

## 2024-04-23 NOTE — ED STATDOCS - PATIENT PORTAL LINK FT
You can access the FollowMyHealth Patient Portal offered by [facility] by registering at the following website: [email or web address]. By joining [employer]’s FollowMyHealth portal, you will also be able to view your health information using other applications (apps) compatible with our system.

## 2024-04-23 NOTE — ED STATDOCS - ATTENDING APP SHARED VISIT CONTRIBUTION OF CARE
I, Kush Jacob MD, personally saw the patient with ACP.  I have personally performed a face to face diagnostic evaluation on this patient.  I have reviewed the ACP note and agree with the history, exam, and plan of care, except as noted. I personally made/approved the management plan and take responsibility for the patient management   The initial assessment was performed by myself and then the patient was handed off to the ACP. The patient was followed and re-evaluated by the ACP. All labs, imaging and procedures were evaluated and performed by the ACP and I was available for consultation if any questions in the patients care came up.   I personally made/approved the management plan and take responsibility for the patient management.

## 2024-04-23 NOTE — ED STATDOCS - CARE PROVIDER_API CALL
Evelin Figueroa  Podiatric Surgery  [address], [address]  [place] [address]  Phone: [phone]  Fax: [phone]  Follow Up Time:     Kush Stevens  Podiatric Medicine and Surgery  [address] [place] [id number]  Phone: [phone]  Fax: [phone]  Follow Up Time:

## 2024-04-23 NOTE — ED ADULT NURSE NOTE - NSFALLRISKINTERV_ED_ALL_ED
Assistance OOB with selected safe patient handling equipment if applicable/Assistance with ambulation/Communicate fall risk and risk factors to all staff, patient, and family/Monitor gait and stability/Provide visual cue: yellow wristband, yellow gown, etc/Reinforce activity limits and safety measures with patient and family/Call bell, personal items and telephone in reach/Instruct patient to call for assistance before getting out of bed/chair/stretcher/Non-slip footwear applied when patient is off stretcher/[place] to call system/Physically safe environment - no spills, clutter or unnecessary equipment/Purposeful Proactive Rounding/Room/bathroom lighting operational, light cord in reach

## 2024-04-23 NOTE — ED ADULT NURSE NOTE - OBJECTIVE STATEMENT
Pt ambulatory to the ED with c/o right sided foot pain. Pt states "I have a flat foot". Pt denies any other complaints. Pt sent to XRAY and given pain medication. Pt denies traumatic injuries.

## 2024-05-08 ENCOUNTER — APPOINTMENT (OUTPATIENT)
Dept: ORTHOPEDIC SURGERY | Facility: CLINIC | Age: 44
End: 2024-05-08

## 2025-05-31 ENCOUNTER — NON-APPOINTMENT (OUTPATIENT)
Age: 45
End: 2025-05-31

## 2025-06-02 ENCOUNTER — EMERGENCY (EMERGENCY)
Facility: HOSPITAL | Age: 45
LOS: 0 days | Discharge: HOME | End: 2025-06-02
Attending: EMERGENCY MEDICINE
Payer: COMMERCIAL

## 2025-06-02 VITALS
TEMPERATURE: 98 F | SYSTOLIC BLOOD PRESSURE: 161 MMHG | DIASTOLIC BLOOD PRESSURE: 72 MMHG | HEART RATE: 87 BPM | OXYGEN SATURATION: 98 % | RESPIRATION RATE: 18 BRPM

## 2025-06-02 VITALS
RESPIRATION RATE: 16 BRPM | OXYGEN SATURATION: 100 % | HEART RATE: 97 BPM | TEMPERATURE: 98 F | SYSTOLIC BLOOD PRESSURE: 135 MMHG | DIASTOLIC BLOOD PRESSURE: 76 MMHG

## 2025-06-02 DIAGNOSIS — M54.50 LOW BACK PAIN, UNSPECIFIED: ICD-10-CM

## 2025-06-02 DIAGNOSIS — Z88.0 ALLERGY STATUS TO PENICILLIN: ICD-10-CM

## 2025-06-02 DIAGNOSIS — J45.909 UNSPECIFIED ASTHMA, UNCOMPLICATED: ICD-10-CM

## 2025-06-02 DIAGNOSIS — M54.6 PAIN IN THORACIC SPINE: ICD-10-CM

## 2025-06-02 DIAGNOSIS — M54.9 DORSALGIA, UNSPECIFIED: ICD-10-CM

## 2025-06-02 DIAGNOSIS — R06.02 SHORTNESS OF BREATH: ICD-10-CM

## 2025-06-02 LAB
ALBUMIN SERPL ELPH-MCNC: 3.6 G/DL — SIGNIFICANT CHANGE UP (ref 3.3–5)
ALP SERPL-CCNC: 67 U/L — SIGNIFICANT CHANGE UP (ref 40–120)
ALT FLD-CCNC: 29 U/L — SIGNIFICANT CHANGE UP (ref 12–78)
ANION GAP SERPL CALC-SCNC: 2 MMOL/L — LOW (ref 5–17)
APPEARANCE UR: CLEAR — SIGNIFICANT CHANGE UP
AST SERPL-CCNC: 24 U/L — SIGNIFICANT CHANGE UP (ref 15–37)
BASOPHILS # BLD AUTO: 0.03 K/UL — SIGNIFICANT CHANGE UP (ref 0–0.2)
BASOPHILS NFR BLD AUTO: 0.6 % — SIGNIFICANT CHANGE UP (ref 0–2)
BILIRUB SERPL-MCNC: 0.5 MG/DL — SIGNIFICANT CHANGE UP (ref 0.2–1.2)
BILIRUB UR-MCNC: NEGATIVE — SIGNIFICANT CHANGE UP
BUN SERPL-MCNC: 12 MG/DL — SIGNIFICANT CHANGE UP (ref 7–23)
CALCIUM SERPL-MCNC: 9.5 MG/DL — SIGNIFICANT CHANGE UP (ref 8.5–10.1)
CHLORIDE SERPL-SCNC: 105 MMOL/L — SIGNIFICANT CHANGE UP (ref 96–108)
CO2 SERPL-SCNC: 29 MMOL/L — SIGNIFICANT CHANGE UP (ref 22–31)
COLOR SPEC: YELLOW — SIGNIFICANT CHANGE UP
CREAT SERPL-MCNC: 1.25 MG/DL — SIGNIFICANT CHANGE UP (ref 0.5–1.3)
D DIMER BLD IA.RAPID-MCNC: 418 NG/ML DDU — HIGH
DIFF PNL FLD: NEGATIVE — SIGNIFICANT CHANGE UP
EGFR: 71 ML/MIN/1.73M2 — SIGNIFICANT CHANGE UP
EGFR: 71 ML/MIN/1.73M2 — SIGNIFICANT CHANGE UP
EOSINOPHIL # BLD AUTO: 0.32 K/UL — SIGNIFICANT CHANGE UP (ref 0–0.5)
EOSINOPHIL NFR BLD AUTO: 6.1 % — HIGH (ref 0–6)
GLUCOSE SERPL-MCNC: 115 MG/DL — HIGH (ref 70–99)
GLUCOSE UR QL: NEGATIVE MG/DL — SIGNIFICANT CHANGE UP
HCT VFR BLD CALC: 43.1 % — SIGNIFICANT CHANGE UP (ref 39–50)
HGB BLD-MCNC: 13.9 G/DL — SIGNIFICANT CHANGE UP (ref 13–17)
IMM GRANULOCYTES # BLD AUTO: 0.01 K/UL — SIGNIFICANT CHANGE UP (ref 0–0.07)
IMM GRANULOCYTES NFR BLD AUTO: 0.2 % — SIGNIFICANT CHANGE UP (ref 0–0.9)
KETONES UR QL: NEGATIVE MG/DL — SIGNIFICANT CHANGE UP
LEUKOCYTE ESTERASE UR-ACNC: NEGATIVE — SIGNIFICANT CHANGE UP
LYMPHOCYTES # BLD AUTO: 2.21 K/UL — SIGNIFICANT CHANGE UP (ref 1–3.3)
LYMPHOCYTES NFR BLD AUTO: 42 % — SIGNIFICANT CHANGE UP (ref 13–44)
MCHC RBC-ENTMCNC: 28.9 PG — SIGNIFICANT CHANGE UP (ref 27–34)
MCHC RBC-ENTMCNC: 32.3 G/DL — SIGNIFICANT CHANGE UP (ref 32–36)
MCV RBC AUTO: 89.6 FL — SIGNIFICANT CHANGE UP (ref 80–100)
MONOCYTES # BLD AUTO: 0.46 K/UL — SIGNIFICANT CHANGE UP (ref 0–0.9)
MONOCYTES NFR BLD AUTO: 8.7 % — SIGNIFICANT CHANGE UP (ref 2–14)
NEUTROPHILS # BLD AUTO: 2.23 K/UL — SIGNIFICANT CHANGE UP (ref 1.8–7.4)
NEUTROPHILS NFR BLD AUTO: 42.4 % — LOW (ref 43–77)
NITRITE UR-MCNC: NEGATIVE — SIGNIFICANT CHANGE UP
NRBC # BLD AUTO: 0 K/UL — SIGNIFICANT CHANGE UP (ref 0–0)
NRBC # FLD: 0 K/UL — SIGNIFICANT CHANGE UP (ref 0–0)
NRBC BLD AUTO-RTO: 0 /100 WBCS — SIGNIFICANT CHANGE UP (ref 0–0)
PH UR: 6.5 — SIGNIFICANT CHANGE UP (ref 5–8)
PLATELET # BLD AUTO: 272 K/UL — SIGNIFICANT CHANGE UP (ref 150–400)
PMV BLD: 9.6 FL — SIGNIFICANT CHANGE UP (ref 7–13)
POTASSIUM SERPL-MCNC: 3.9 MMOL/L — SIGNIFICANT CHANGE UP (ref 3.5–5.3)
POTASSIUM SERPL-SCNC: 3.9 MMOL/L — SIGNIFICANT CHANGE UP (ref 3.5–5.3)
PROT SERPL-MCNC: 7.9 GM/DL — SIGNIFICANT CHANGE UP (ref 6–8.3)
PROT UR-MCNC: NEGATIVE MG/DL — SIGNIFICANT CHANGE UP
RBC # BLD: 4.81 M/UL — SIGNIFICANT CHANGE UP (ref 4.2–5.8)
RBC # FLD: 13 % — SIGNIFICANT CHANGE UP (ref 10.3–14.5)
SODIUM SERPL-SCNC: 136 MMOL/L — SIGNIFICANT CHANGE UP (ref 135–145)
SP GR SPEC: 1.02 — SIGNIFICANT CHANGE UP (ref 1–1.03)
TROPONIN I, HIGH SENSITIVITY RESULT: 15.67 NG/L — SIGNIFICANT CHANGE UP
UROBILINOGEN FLD QL: 1 MG/DL — SIGNIFICANT CHANGE UP (ref 0.2–1)
WBC # BLD: 5.26 K/UL — SIGNIFICANT CHANGE UP (ref 3.8–10.5)
WBC # FLD AUTO: 5.26 K/UL — SIGNIFICANT CHANGE UP (ref 3.8–10.5)

## 2025-06-02 PROCEDURE — 84484 ASSAY OF TROPONIN QUANT: CPT

## 2025-06-02 PROCEDURE — 81003 URINALYSIS AUTO W/O SCOPE: CPT

## 2025-06-02 PROCEDURE — 93005 ELECTROCARDIOGRAM TRACING: CPT

## 2025-06-02 PROCEDURE — 71275 CT ANGIOGRAPHY CHEST: CPT

## 2025-06-02 PROCEDURE — 85379 FIBRIN DEGRADATION QUANT: CPT

## 2025-06-02 PROCEDURE — 80053 COMPREHEN METABOLIC PANEL: CPT

## 2025-06-02 PROCEDURE — 99285 EMERGENCY DEPT VISIT HI MDM: CPT

## 2025-06-02 PROCEDURE — 96374 THER/PROPH/DIAG INJ IV PUSH: CPT | Mod: XU

## 2025-06-02 PROCEDURE — 99285 EMERGENCY DEPT VISIT HI MDM: CPT | Mod: 25

## 2025-06-02 PROCEDURE — 71046 X-RAY EXAM CHEST 2 VIEWS: CPT

## 2025-06-02 PROCEDURE — 93010 ELECTROCARDIOGRAM REPORT: CPT

## 2025-06-02 PROCEDURE — 71275 CT ANGIOGRAPHY CHEST: CPT | Mod: 26

## 2025-06-02 PROCEDURE — 85025 COMPLETE CBC W/AUTO DIFF WBC: CPT

## 2025-06-02 PROCEDURE — 36415 COLL VENOUS BLD VENIPUNCTURE: CPT

## 2025-06-02 PROCEDURE — 71046 X-RAY EXAM CHEST 2 VIEWS: CPT | Mod: 26

## 2025-06-02 RX ORDER — CYCLOBENZAPRINE HYDROCHLORIDE 15 MG/1
10 CAPSULE, EXTENDED RELEASE ORAL ONCE
Refills: 0 | Status: COMPLETED | OUTPATIENT
Start: 2025-06-02 | End: 2025-06-02

## 2025-06-02 RX ORDER — CYCLOBENZAPRINE HYDROCHLORIDE 15 MG/1
1 CAPSULE, EXTENDED RELEASE ORAL
Qty: 20 | Refills: 0
Start: 2025-06-02

## 2025-06-02 RX ORDER — KETOROLAC TROMETHAMINE 30 MG/ML
30 INJECTION, SOLUTION INTRAMUSCULAR; INTRAVENOUS ONCE
Refills: 0 | Status: DISCONTINUED | OUTPATIENT
Start: 2025-06-02 | End: 2025-06-02

## 2025-06-02 RX ADMIN — KETOROLAC TROMETHAMINE 30 MILLIGRAM(S): 30 INJECTION, SOLUTION INTRAMUSCULAR; INTRAVENOUS at 14:58

## 2025-06-02 RX ADMIN — CYCLOBENZAPRINE HYDROCHLORIDE 10 MILLIGRAM(S): 15 CAPSULE, EXTENDED RELEASE ORAL at 15:01

## 2025-06-02 NOTE — ED STATDOCS - CLINICAL SUMMARY MEDICAL DECISION MAKING FREE TEXT BOX
weakness and deconditioning
44 y/o M with PMHX of asthma presents to ED c/o  episode of back pain and SOB at 1130am after going to the gym this morning. Plan labs, CXR, EKG. Unlikely cardiac. Likely musculoskeletal pain . If everything normal , recommend outpatient cardio f/u for outpatient stress test.

## 2025-06-02 NOTE — ED ADULT TRIAGE NOTE - CHIEF COMPLAINT QUOTE
Pt A&OX3, presenting to the ER with c/o back pain/injury. Pt reports going to the gym this morning. After the gym he went to the car wash, while there he had a few seconds of shortness of breath followed by back pain that from the lower back up. "It feels like some one is sticking a knife in my back." Hurts to breath.   Pt denies CP, injury, falls, dizzy or lightheaded, numbness or tingling in extremities, bladder or bowel dysfunction.

## 2025-06-02 NOTE — ED ADULT NURSE NOTE - OBJECTIVE STATEMENT
The patient is a 45y Male complaining of back pain/injury. Pt reports going to the gym this morning. After the gym he went to the car wash, while there he had a few seconds of shortness of breath followed by back pain that from the lower back up. 20 g IV inserted labs drawn. Pt is A & O x4 GCS 15. Pt placed on cardiac monitor.

## 2025-06-02 NOTE — ED STATDOCS - OBJECTIVE STATEMENT
46 y/o M with PMHX of asthma presents to ED c/o  episode of back pain and SOB at 1130am. Pt reports going to the gym this morning , then went to the car wash, while there he had episode of back pain and felt SOB. Non smoker.

## 2025-06-02 NOTE — ED STATDOCS - PROGRESS NOTE DETAILS
44 y/o M with PMH of asthma presents with mid and lower back pain with SOB today. Pt states he went to the gym today, did exercises he does routinely. States he then went to a carwash and had difficulty catching his breath. Denies fever, chills, trauma nausea, vomiting. Did not take medications prior to arrival today. Cardiac: s1s2, RRR. lungs: CTAB. Abdomen: NBS x4 soft, nontender. MSK: No midline spinal tenderness. +bilateral paraspinal lumbar and thoracic spine tenderness. A/P: likely MSK strain. Will check labs, CXR, EKG, symptomatic care, reassess. - Supa Barajas PA-C Labs and imaging reviewed with patient. Noted to have elevated d-dimer. CTA chest negative. Pt feels well with flexeril and toradol. Frandy ferguson. - Supa Barajas PA-C

## 2025-06-02 NOTE — ED STATDOCS - ATTENDING APP SHARED VISIT CONTRIBUTION OF CARE
I personally saw the patient with the DEBBIE, and completed the key components of the history and physical exam. I then discussed the management plan with the DEBBIE.

## 2025-06-02 NOTE — ED STATDOCS - PATIENT PORTAL LINK FT
You can access the FollowMyHealth Patient Portal offered by [facility] by registering at the following website: [email or web address]. By joining [employer]’s FollowMyHealth portal, you will also be able to view your health information using other applications (apps) compatible with our system.

## 2025-06-12 NOTE — ED STATDOCS - TOBACCO USE
Care Transitions Program Week 2 Follow-up Call    Current Issues/Problems reviewed on call: Patient states she is still having pain. \"Yesterday I was attempting to help put groceries away, and now today my neck ad shoulders hurt\" Patient states her PCP prescribed Gabapentin 300 mg capsules TID. Patient states her MD is suspecting patient may have a pinched nerve. Patient states her PICC line is C/D/I, states the home health RN visits once a week to change the dressing. Patient states she is able to complete IV ABX infusions without any complications.     Details of Interventions/Education completed on call: Continue POC    Review of Systems:   Care Transition System Evaluation:     Fever: is not present  Pain:     Pain Location:  Neck, shoulders, back, left hip and left leg     General Symptoms: Fatigue  Neurologic/Neuromuscular Symptoms: Weakness; Fatigue  ENT Symptoms:  (denied any concerns)  Respiratory Symptoms:  (denied shortness of breath)  Cardiovascular Symptoms: Fluid retention; Fatigue; Edema (denied chest pain or heart palpations)  GI Symptoms:  (denied any concerns)   Symptoms:  (denied any concerns)  Skin Symptoms: Bruising  Sleeping Behaviors:Reports no problem  Current Lines/Drains:No  Line/Drain Type:  PICC  Line/Drain Status: C/D/I  Is patient confident in appropriate use and care of line/drain: Yes      The patient is taking all medications as prescribed. The patient did not have questions or concerns regarding the medications prescribed.    Transitional Care Management (TCM) appointment was completed.  TCM appointment plan of care and upcoming appointments were reviewed with patient.  Transportation to upcoming appointments to be provided by .    Next Care Transitions follow-up call will be within 1 week.    Plan for next follow-up call: Review chart prior to outreach call to ensure no gaps in care. Complete assessment and notify and collaborate with multi-disciplinary team as needed  regarding any concerns. Offer support, compassion, education, resources as needed   Never smoker